# Patient Record
Sex: MALE | Race: WHITE | NOT HISPANIC OR LATINO | Employment: FULL TIME | URBAN - METROPOLITAN AREA
[De-identification: names, ages, dates, MRNs, and addresses within clinical notes are randomized per-mention and may not be internally consistent; named-entity substitution may affect disease eponyms.]

---

## 2019-03-13 ENCOUNTER — HOSPITAL ENCOUNTER (OUTPATIENT)
Dept: FAMILY MEDICINE CLINIC | Facility: CLINIC | Age: 59
Discharge: HOME | End: 2019-03-13
Attending: FAMILY MEDICINE

## 2019-03-13 LAB
ALBUMIN SERPL-MCNC: 4.3 G/DL (ref 3.5–5)
ALBUMIN/GLOB SERPL: 1.6 {RATIO} (ref 1.4–2.6)
ALP SERPL-CCNC: 78 U/L (ref 56–119)
ALT SERPL-CCNC: 28 U/L (ref 10–40)
ANION GAP SERPL CALC-SCNC: 15 MMOL/L (ref 8–19)
AST SERPL-CCNC: 22 U/L (ref 15–50)
BILIRUB SERPL-MCNC: 0.5 MG/DL (ref 0.2–1.3)
BUN SERPL-MCNC: 17 MG/DL (ref 5–25)
BUN/CREAT SERPL: 16 {RATIO} (ref 6–20)
CALCIUM SERPL-MCNC: 9.4 MG/DL (ref 8.7–10.4)
CHLORIDE SERPL-SCNC: 105 MMOL/L (ref 99–111)
CHOLEST SERPL-MCNC: 167 MG/DL (ref 107–200)
CHOLEST/HDLC SERPL: 3.6 {RATIO} (ref 3–6)
CONV CO2: 28 MMOL/L (ref 22–32)
CONV TOTAL PROTEIN: 7 G/DL (ref 6.3–8.2)
CREAT UR-MCNC: 1.06 MG/DL (ref 0.7–1.2)
EST. AVERAGE GLUCOSE BLD GHB EST-MCNC: 108 MG/DL
GFR SERPLBLD BASED ON 1.73 SQ M-ARVRAT: >60 ML/MIN/{1.73_M2}
GLOBULIN UR ELPH-MCNC: 2.7 G/DL (ref 2–3.5)
GLUCOSE SERPL-MCNC: 92 MG/DL (ref 70–99)
HBA1C MFR BLD: 5.4 % (ref 3.5–5.7)
HDLC SERPL-MCNC: 47 MG/DL (ref 40–60)
LDLC SERPL CALC-MCNC: 94 MG/DL (ref 70–100)
OSMOLALITY SERPL CALC.SUM OF ELEC: 299 MOSM/KG (ref 273–304)
POTASSIUM SERPL-SCNC: 3.5 MMOL/L (ref 3.5–5.3)
SODIUM SERPL-SCNC: 144 MMOL/L (ref 135–147)
TRIGL SERPL-MCNC: 128 MG/DL (ref 40–150)
TSH SERPL-ACNC: 1.13 M[IU]/L (ref 0.27–4.2)
VLDLC SERPL-MCNC: 26 MG/DL (ref 5–37)

## 2019-07-09 ENCOUNTER — HOSPITAL ENCOUNTER (OUTPATIENT)
Dept: LAB | Facility: HOSPITAL | Age: 59
Discharge: HOME | End: 2019-07-09
Attending: FAMILY MEDICINE

## 2019-07-09 LAB — PSA SERPL-MCNC: 1.07 NG/ML (ref 0–4)

## 2019-11-06 ENCOUNTER — HOSPITAL ENCOUNTER (OUTPATIENT)
Dept: FAMILY MEDICINE CLINIC | Facility: CLINIC | Age: 59
Discharge: HOME | End: 2019-11-06
Attending: FAMILY MEDICINE

## 2019-11-06 LAB
ALBUMIN SERPL-MCNC: 4.3 G/DL (ref 3.5–5)
ALBUMIN/GLOB SERPL: 1.5 {RATIO} (ref 1.4–2.6)
ALP SERPL-CCNC: 78 U/L (ref 56–155)
ALT SERPL-CCNC: 28 U/L (ref 10–40)
ANION GAP SERPL CALC-SCNC: 16 MMOL/L (ref 8–19)
AST SERPL-CCNC: 22 U/L (ref 15–50)
BASOPHILS # BLD AUTO: 0.06 10*3/UL (ref 0–0.2)
BASOPHILS NFR BLD AUTO: 1 % (ref 0–3)
BILIRUB SERPL-MCNC: 0.39 MG/DL (ref 0.2–1.3)
BUN SERPL-MCNC: 18 MG/DL (ref 5–25)
BUN/CREAT SERPL: 16 {RATIO} (ref 6–20)
CALCIUM SERPL-MCNC: 9.2 MG/DL (ref 8.7–10.4)
CHLORIDE SERPL-SCNC: 101 MMOL/L (ref 99–111)
CONV ABS IMM GRAN: 0.02 10*3/UL (ref 0–0.2)
CONV CO2: 26 MMOL/L (ref 22–32)
CONV IMMATURE GRAN: 0.3 % (ref 0–1.8)
CONV TOTAL PROTEIN: 7.2 G/DL (ref 6.3–8.2)
CREAT UR-MCNC: 1.15 MG/DL (ref 0.7–1.2)
DEPRECATED RDW RBC AUTO: 40 FL (ref 35.1–43.9)
EOSINOPHIL # BLD AUTO: 0.03 10*3/UL (ref 0–0.7)
EOSINOPHIL # BLD AUTO: 0.5 % (ref 0–7)
ERYTHROCYTE [DISTWIDTH] IN BLOOD BY AUTOMATED COUNT: 13.1 % (ref 11.6–14.4)
EST. AVERAGE GLUCOSE BLD GHB EST-MCNC: 111 MG/DL
GFR SERPLBLD BASED ON 1.73 SQ M-ARVRAT: >60 ML/MIN/{1.73_M2}
GLOBULIN UR ELPH-MCNC: 2.9 G/DL (ref 2–3.5)
GLUCOSE SERPL-MCNC: 91 MG/DL (ref 70–99)
HBA1C MFR BLD: 5.5 % (ref 3.5–5.7)
HCT VFR BLD AUTO: 44.6 % (ref 42–52)
HGB BLD-MCNC: 14.7 G/DL (ref 14–18)
LYMPHOCYTES # BLD AUTO: 1.11 10*3/UL (ref 1–5)
LYMPHOCYTES NFR BLD AUTO: 18 % (ref 20–45)
MCH RBC QN AUTO: 27.9 PG (ref 27–31)
MCHC RBC AUTO-ENTMCNC: 33 G/DL (ref 33–37)
MCV RBC AUTO: 84.8 FL (ref 80–96)
MONOCYTES # BLD AUTO: 0.44 10*3/UL (ref 0.2–1.2)
MONOCYTES NFR BLD AUTO: 7.1 % (ref 3–10)
NEUTROPHILS # BLD AUTO: 4.52 10*3/UL (ref 2–8)
NEUTROPHILS NFR BLD AUTO: 73.1 % (ref 30–85)
NRBC CBCN: 0 % (ref 0–0.7)
OSMOLALITY SERPL CALC.SUM OF ELEC: 289 MOSM/KG (ref 273–304)
PLATELET # BLD AUTO: 187 10*3/UL (ref 130–400)
PMV BLD AUTO: 10.9 FL (ref 9.4–12.4)
POTASSIUM SERPL-SCNC: 3.8 MMOL/L (ref 3.5–5.3)
RBC # BLD AUTO: 5.26 10*6/UL (ref 4.7–6.1)
SODIUM SERPL-SCNC: 139 MMOL/L (ref 135–147)
TSH SERPL-ACNC: 1.29 M[IU]/L (ref 0.27–4.2)
WBC # BLD AUTO: 6.18 10*3/UL (ref 4.8–10.8)

## 2020-01-20 ENCOUNTER — HOSPITAL ENCOUNTER (OUTPATIENT)
Dept: SLEEP MEDICINE | Facility: HOSPITAL | Age: 60
Discharge: HOME | End: 2020-01-20
Attending: INTERNAL MEDICINE

## 2020-01-20 ENCOUNTER — OUTSIDE FACILITY SERVICE (OUTPATIENT)
Dept: SLEEP MEDICINE | Facility: HOSPITAL | Age: 60
End: 2020-01-20

## 2020-01-20 PROCEDURE — 99244 OFF/OP CNSLTJ NEW/EST MOD 40: CPT | Performed by: INTERNAL MEDICINE

## 2020-01-29 ENCOUNTER — HOSPITAL ENCOUNTER (OUTPATIENT)
Dept: SLEEP MEDICINE | Facility: HOSPITAL | Age: 60
Discharge: HOME | End: 2020-01-29
Attending: INTERNAL MEDICINE

## 2020-02-03 ENCOUNTER — OUTSIDE FACILITY SERVICE (OUTPATIENT)
Dept: SLEEP MEDICINE | Facility: HOSPITAL | Age: 60
End: 2020-02-03

## 2020-02-03 PROCEDURE — 95806 SLEEP STUDY UNATT&RESP EFFT: CPT | Performed by: INTERNAL MEDICINE

## 2020-06-17 ENCOUNTER — HOSPITAL ENCOUNTER (OUTPATIENT)
Dept: LAB | Facility: HOSPITAL | Age: 60
Discharge: HOME | End: 2020-06-17
Attending: FAMILY MEDICINE

## 2020-06-17 LAB
ALBUMIN SERPL-MCNC: 4.2 G/DL (ref 3.5–5)
ALBUMIN/GLOB SERPL: 1.4 {RATIO} (ref 1.4–2.6)
ALP SERPL-CCNC: 80 U/L (ref 56–155)
ALT SERPL-CCNC: 27 U/L (ref 10–40)
ANION GAP SERPL CALC-SCNC: 20 MMOL/L (ref 8–19)
AST SERPL-CCNC: 25 U/L (ref 15–50)
BILIRUB SERPL-MCNC: 0.46 MG/DL (ref 0.2–1.3)
BUN SERPL-MCNC: 11 MG/DL (ref 5–25)
BUN/CREAT SERPL: 10 {RATIO} (ref 6–20)
CALCIUM SERPL-MCNC: 9.2 MG/DL (ref 8.7–10.4)
CHLORIDE SERPL-SCNC: 102 MMOL/L (ref 99–111)
CHOLEST SERPL-MCNC: 159 MG/DL (ref 107–200)
CHOLEST/HDLC SERPL: 3.1 {RATIO} (ref 3–6)
CONV CO2: 23 MMOL/L (ref 22–32)
CONV TOTAL PROTEIN: 7.1 G/DL (ref 6.3–8.2)
CREAT UR-MCNC: 1.07 MG/DL (ref 0.7–1.2)
EST. AVERAGE GLUCOSE BLD GHB EST-MCNC: 123 MG/DL
GFR SERPLBLD BASED ON 1.73 SQ M-ARVRAT: >60 ML/MIN/{1.73_M2}
GLOBULIN UR ELPH-MCNC: 2.9 G/DL (ref 2–3.5)
GLUCOSE SERPL-MCNC: 95 MG/DL (ref 70–99)
HBA1C MFR BLD: 5.9 % (ref 3.5–5.7)
HDLC SERPL-MCNC: 52 MG/DL (ref 40–60)
LDLC SERPL CALC-MCNC: 83 MG/DL (ref 70–100)
OSMOLALITY SERPL CALC.SUM OF ELEC: 291 MOSM/KG (ref 273–304)
POTASSIUM SERPL-SCNC: 3.7 MMOL/L (ref 3.5–5.3)
SODIUM SERPL-SCNC: 141 MMOL/L (ref 135–147)
TRIGL SERPL-MCNC: 121 MG/DL (ref 40–150)
TSH SERPL-ACNC: 2.47 M[IU]/L (ref 0.27–4.2)
VLDLC SERPL-MCNC: 24 MG/DL (ref 5–37)

## 2020-11-16 ENCOUNTER — HOSPITAL ENCOUNTER (OUTPATIENT)
Dept: FAMILY MEDICINE CLINIC | Facility: CLINIC | Age: 60
Discharge: HOME | End: 2020-11-16
Attending: FAMILY MEDICINE

## 2020-11-16 LAB
EST. AVERAGE GLUCOSE BLD GHB EST-MCNC: 140 MG/DL
HBA1C MFR BLD: 6.5 % (ref 3.5–5.7)

## 2021-03-03 ENCOUNTER — HOSPITAL ENCOUNTER (OUTPATIENT)
Dept: FAMILY MEDICINE CLINIC | Facility: CLINIC | Age: 61
Discharge: HOME | End: 2021-03-03
Attending: FAMILY MEDICINE

## 2021-03-03 LAB
ALBUMIN SERPL-MCNC: 4.1 G/DL (ref 3.5–5)
ALBUMIN/GLOB SERPL: 1.5 {RATIO} (ref 1.4–2.6)
ALP SERPL-CCNC: 75 U/L (ref 56–155)
ALT SERPL-CCNC: 25 U/L (ref 10–40)
ANION GAP SERPL CALC-SCNC: 16 MMOL/L (ref 8–19)
AST SERPL-CCNC: 15 U/L (ref 15–50)
BILIRUB SERPL-MCNC: 0.62 MG/DL (ref 0.2–1.3)
BUN SERPL-MCNC: 13 MG/DL (ref 5–25)
BUN/CREAT SERPL: 11 {RATIO} (ref 6–20)
CALCIUM SERPL-MCNC: 9.6 MG/DL (ref 8.7–10.4)
CHLORIDE SERPL-SCNC: 101 MMOL/L (ref 99–111)
CHOLEST SERPL-MCNC: 175 MG/DL (ref 107–200)
CHOLEST/HDLC SERPL: 3 {RATIO} (ref 3–6)
CONV CO2: 28 MMOL/L (ref 22–32)
CONV TOTAL PROTEIN: 6.8 G/DL (ref 6.3–8.2)
CREAT UR-MCNC: 1.16 MG/DL (ref 0.7–1.2)
EST. AVERAGE GLUCOSE BLD GHB EST-MCNC: 108 MG/DL
GFR SERPLBLD BASED ON 1.73 SQ M-ARVRAT: >60 ML/MIN/{1.73_M2}
GLOBULIN UR ELPH-MCNC: 2.7 G/DL (ref 2–3.5)
GLUCOSE SERPL-MCNC: 93 MG/DL (ref 70–99)
HBA1C MFR BLD: 5.4 % (ref 3.5–5.7)
HDLC SERPL-MCNC: 59 MG/DL (ref 40–60)
LDLC SERPL CALC-MCNC: 92 MG/DL (ref 70–100)
OSMOLALITY SERPL CALC.SUM OF ELEC: 292 MOSM/KG (ref 273–304)
POTASSIUM SERPL-SCNC: 4.2 MMOL/L (ref 3.5–5.3)
SODIUM SERPL-SCNC: 141 MMOL/L (ref 135–147)
TRIGL SERPL-MCNC: 122 MG/DL (ref 40–150)
VLDLC SERPL-MCNC: 24 MG/DL (ref 5–37)

## 2021-03-08 ENCOUNTER — HOSPITAL ENCOUNTER (OUTPATIENT)
Dept: VACCINE CLINIC | Facility: HOSPITAL | Age: 61
Discharge: HOME | End: 2021-03-08
Attending: INTERNAL MEDICINE

## 2021-03-29 ENCOUNTER — HOSPITAL ENCOUNTER (OUTPATIENT)
Dept: VACCINE CLINIC | Facility: HOSPITAL | Age: 61
Discharge: HOME | End: 2021-03-29
Attending: INTERNAL MEDICINE

## 2021-03-31 ENCOUNTER — OFFICE VISIT CONVERTED (OUTPATIENT)
Dept: SURGERY | Facility: CLINIC | Age: 61
End: 2021-03-31
Attending: NURSE PRACTITIONER

## 2021-04-08 ENCOUNTER — HOSPITAL ENCOUNTER (OUTPATIENT)
Dept: GASTROENTEROLOGY | Facility: HOSPITAL | Age: 61
Setting detail: HOSPITAL OUTPATIENT SURGERY
Discharge: HOME | End: 2021-04-08
Attending: SURGERY

## 2021-04-08 LAB
ALBUMIN SERPL-MCNC: 4 G/DL (ref 3.5–5)
ALBUMIN/GLOB SERPL: 1.3 {RATIO} (ref 1.4–2.6)
ALP SERPL-CCNC: 82 U/L (ref 56–155)
ALT SERPL-CCNC: 28 U/L (ref 10–40)
ANION GAP SERPL CALC-SCNC: 14 MMOL/L (ref 8–19)
AST SERPL-CCNC: 16 U/L (ref 15–50)
BASOPHILS # BLD AUTO: 0.03 10*3/UL (ref 0–0.2)
BASOPHILS NFR BLD AUTO: 0.3 % (ref 0–3)
BILIRUB SERPL-MCNC: 0.56 MG/DL (ref 0.2–1.3)
BUN SERPL-MCNC: 9 MG/DL (ref 5–25)
BUN/CREAT SERPL: 9 {RATIO} (ref 6–20)
CALCIUM SERPL-MCNC: 9 MG/DL (ref 8.7–10.4)
CHLORIDE SERPL-SCNC: 104 MMOL/L (ref 99–111)
CONV ABS IMM GRAN: 0.05 10*3/UL (ref 0–0.2)
CONV CO2: 26 MMOL/L (ref 22–32)
CONV IMMATURE GRAN: 0.5 % (ref 0–1.8)
CONV TOTAL PROTEIN: 7 G/DL (ref 6.3–8.2)
CREAT UR-MCNC: 1.01 MG/DL (ref 0.7–1.2)
DEPRECATED RDW RBC AUTO: 39.9 FL (ref 35.1–43.9)
EOSINOPHIL # BLD AUTO: 0 % (ref 0–7)
EOSINOPHIL # BLD AUTO: 0 10*3/UL (ref 0–0.7)
ERYTHROCYTE [DISTWIDTH] IN BLOOD BY AUTOMATED COUNT: 13.2 % (ref 11.6–14.4)
GFR SERPLBLD BASED ON 1.73 SQ M-ARVRAT: >60 ML/MIN/{1.73_M2}
GLOBULIN UR ELPH-MCNC: 3 G/DL (ref 2–3.5)
GLUCOSE SERPL-MCNC: 113 MG/DL (ref 70–99)
HCT VFR BLD AUTO: 47.5 % (ref 42–52)
HGB BLD-MCNC: 15.9 G/DL (ref 14–18)
LYMPHOCYTES # BLD AUTO: 1.03 10*3/UL (ref 1–5)
LYMPHOCYTES NFR BLD AUTO: 10.2 % (ref 20–45)
MCH RBC QN AUTO: 28 PG (ref 27–31)
MCHC RBC AUTO-ENTMCNC: 33.5 G/DL (ref 33–37)
MCV RBC AUTO: 83.8 FL (ref 80–96)
MONOCYTES # BLD AUTO: 0.37 10*3/UL (ref 0.2–1.2)
MONOCYTES NFR BLD AUTO: 3.7 % (ref 3–10)
NEUTROPHILS # BLD AUTO: 8.57 10*3/UL (ref 2–8)
NEUTROPHILS NFR BLD AUTO: 85.3 % (ref 30–85)
NRBC CBCN: 0 % (ref 0–0.7)
OSMOLALITY SERPL CALC.SUM OF ELEC: 291 MOSM/KG (ref 273–304)
PLATELET # BLD AUTO: 225 10*3/UL (ref 130–400)
PMV BLD AUTO: 9.7 FL (ref 9.4–12.4)
POTASSIUM SERPL-SCNC: 3.4 MMOL/L (ref 3.5–5.3)
RBC # BLD AUTO: 5.67 10*6/UL (ref 4.7–6.1)
SODIUM SERPL-SCNC: 141 MMOL/L (ref 135–147)
TROPONIN T SERPL-MCNC: <0.01 NG/ML (ref 0–0.1)
TROPONIN T SERPL-MCNC: <0.01 NG/ML (ref 0–0.1)
WBC # BLD AUTO: 10.05 10*3/UL (ref 4.8–10.8)

## 2021-04-12 ENCOUNTER — OFFICE VISIT CONVERTED (OUTPATIENT)
Dept: CARDIOLOGY | Facility: CLINIC | Age: 61
End: 2021-04-12
Attending: INTERNAL MEDICINE

## 2021-04-23 ENCOUNTER — OFFICE VISIT CONVERTED (OUTPATIENT)
Dept: FAMILY MEDICINE CLINIC | Facility: CLINIC | Age: 61
End: 2021-04-23
Attending: FAMILY MEDICINE

## 2021-04-26 ENCOUNTER — HOSPITAL ENCOUNTER (OUTPATIENT)
Dept: NUCLEAR MEDICINE | Facility: HOSPITAL | Age: 61
Discharge: HOME | End: 2021-04-26
Attending: INTERNAL MEDICINE

## 2021-05-03 ENCOUNTER — HOSPITAL ENCOUNTER (OUTPATIENT)
Dept: LAB | Facility: HOSPITAL | Age: 61
Discharge: HOME | End: 2021-05-03
Attending: NURSE PRACTITIONER

## 2021-05-03 LAB
ALBUMIN SERPL-MCNC: 4 G/DL (ref 3.5–5)
ALBUMIN/GLOB SERPL: 1.3 {RATIO} (ref 1.4–2.6)
ALP SERPL-CCNC: 65 U/L (ref 56–155)
ALT SERPL-CCNC: 26 U/L (ref 10–40)
ANION GAP SERPL CALC-SCNC: 13 MMOL/L (ref 8–19)
AST SERPL-CCNC: 19 U/L (ref 15–50)
BASOPHILS # BLD AUTO: 0.04 10*3/UL (ref 0–0.2)
BASOPHILS NFR BLD AUTO: 0.7 % (ref 0–3)
BILIRUB SERPL-MCNC: 0.51 MG/DL (ref 0.2–1.3)
BUN SERPL-MCNC: 11 MG/DL (ref 5–25)
BUN/CREAT SERPL: 9 {RATIO} (ref 6–20)
CALCIUM SERPL-MCNC: 8.9 MG/DL (ref 8.7–10.4)
CHLORIDE SERPL-SCNC: 107 MMOL/L (ref 99–111)
CHOLEST SERPL-MCNC: 154 MG/DL (ref 107–200)
CHOLEST/HDLC SERPL: 3.4 {RATIO} (ref 3–6)
CONV ABS IMM GRAN: 0.01 10*3/UL (ref 0–0.2)
CONV CO2: 26 MMOL/L (ref 22–32)
CONV IMMATURE GRAN: 0.2 % (ref 0–1.8)
CONV TOTAL PROTEIN: 7 G/DL (ref 6.3–8.2)
CREAT UR-MCNC: 1.16 MG/DL (ref 0.7–1.2)
DEPRECATED RDW RBC AUTO: 44.1 FL (ref 35.1–43.9)
EOSINOPHIL # BLD AUTO: 0 % (ref 0–7)
EOSINOPHIL # BLD AUTO: 0 10*3/UL (ref 0–0.7)
ERYTHROCYTE [DISTWIDTH] IN BLOOD BY AUTOMATED COUNT: 14.3 % (ref 11.6–14.4)
GFR SERPLBLD BASED ON 1.73 SQ M-ARVRAT: >60 ML/MIN/{1.73_M2}
GLOBULIN UR ELPH-MCNC: 3 G/DL (ref 2–3.5)
GLUCOSE SERPL-MCNC: 98 MG/DL (ref 70–99)
HCT VFR BLD AUTO: 46.1 % (ref 42–52)
HDLC SERPL-MCNC: 45 MG/DL (ref 40–60)
HGB BLD-MCNC: 15 G/DL (ref 14–18)
LDLC SERPL CALC-MCNC: 86 MG/DL (ref 70–100)
LYMPHOCYTES # BLD AUTO: 1.26 10*3/UL (ref 1–5)
LYMPHOCYTES NFR BLD AUTO: 20.6 % (ref 20–45)
MCH RBC QN AUTO: 27.6 PG (ref 27–31)
MCHC RBC AUTO-ENTMCNC: 32.5 G/DL (ref 33–37)
MCV RBC AUTO: 84.9 FL (ref 80–96)
MONOCYTES # BLD AUTO: 0.39 10*3/UL (ref 0.2–1.2)
MONOCYTES NFR BLD AUTO: 6.4 % (ref 3–10)
NEUTROPHILS # BLD AUTO: 4.42 10*3/UL (ref 2–8)
NEUTROPHILS NFR BLD AUTO: 72.1 % (ref 30–85)
NRBC CBCN: 0 % (ref 0–0.7)
OSMOLALITY SERPL CALC.SUM OF ELEC: 293 MOSM/KG (ref 273–304)
PLATELET # BLD AUTO: 215 10*3/UL (ref 130–400)
PMV BLD AUTO: 11.2 FL (ref 9.4–12.4)
POTASSIUM SERPL-SCNC: 3.5 MMOL/L (ref 3.5–5.3)
RBC # BLD AUTO: 5.43 10*6/UL (ref 4.7–6.1)
SODIUM SERPL-SCNC: 142 MMOL/L (ref 135–147)
TRIGL SERPL-MCNC: 116 MG/DL (ref 40–150)
VLDLC SERPL-MCNC: 23 MG/DL (ref 5–37)
WBC # BLD AUTO: 6.12 10*3/UL (ref 4.8–10.8)

## 2021-05-10 ENCOUNTER — OFFICE VISIT CONVERTED (OUTPATIENT)
Dept: CARDIOLOGY | Facility: CLINIC | Age: 61
End: 2021-05-10
Attending: INTERNAL MEDICINE

## 2021-05-10 NOTE — H&P
History and Physical      Patient Name: Jerman Caro   Patient ID: [id number]   Sex: Male   YOB: 1960    Primary Care Provider: Clay Del Cid MD   Referring Provider: Clay Del Cid MD    Visit Date: March 31, 2021    Provider: OSCAR Dennis   Location: [facility]   Location Address: [address] [place] [address]  [id number]   Location Phone: [phone]          Chief Complaint  · Requesting colonoscopy  · Age 50 or over      History Of Present Illness  The patient is a 60 year old /White male presenting to the Surgical Specialist office on a referral from Clay Del Cid MD.   Jerman Caro needs to have a diagnostic colonoscopy.   Patient states that they have not had a colonoscopy.   Patient currently complains of: abnormal stool study   Patient Does not have family history of colon cancer.      Patient presents today on referral from Dr. Clay Del Cid for a positive Cologuard.  Patient denies any abdominal pain, change in bowel habit, or rectal bleeding.  He denies any family history of colorectal cancer.  No previous colonoscopy.       Past Medical History  Disease Name Date Onset Notes   Allergic rhinitis, chronic --  --    High blood pressure --  --          Past Surgical History  Procedure Name Date Notes   *I have had no surgeries --  --          Medication List  Name Date Started Instructions   amlodipine 10 mg oral tablet  take 1 tablet (10 mg) by oral route once daily   aspirin 81 mg oral tablet,chewable  chew 1 tablet (81 mg) by oral route once daily   metoprolol tartrate 50 mg oral tablet  take 1 tablet (50 mg) by oral route time per day with meals   Suprep Bowel Prep Kit 17.5-3.13-1.6 gram oral recon soln 03/31/2021 take as directed   terazosin 2 mg oral capsule  take 1 capsule (2 mg) by oral route once daily at bedtime   triamterene-hydrochlorothiazid 37.5-25 mg oral tablet  take 1 tablet by oral route once daily         Allergy List  Allergen Name Date  "Reaction Notes   NO KNOWN DRUG ALLERGIES --  --  --        Allergies Reconciled  Social History  Finding Status Start/Stop Quantity Notes   Tobacco --  --/-- --  --          Review of Systems  · Constitutional  o Denies  o : fever, chills  · Eyes  o Denies  o : yellowish discoloration of eyes  · HENT  o Denies  o : difficulty swallowing  · Cardiovascular  o Denies  o : chest pain, chest pain on exertion  · Respiratory  o Denies  o : shortness of breath  · Gastrointestinal  o Denies  o : nausea, vomiting, diarrhea, constipation  · Genitourinary  o Denies  o : abnormal color of urine  · Integument  o Denies  o : rash  · Neurologic  o Denies  o : tingling or numbness  · Musculoskeletal  o Denies  o : joint pain  · Endocrine  o Denies  o : weight gain, weight loss      Vitals  Date Time BP Position Site L\R Cuff Size HR RR TEMP (F) WT  HT  BMI kg/m2 BSA m2 O2 Sat FR L/min FiO2 HC       03/31/2021 03:33 PM       16  269lbs 2oz 5'  10\" 38.62 2.46             Physical Examination  · Constitutional  o Appearance  o : well developed, well-nourished, patient in no apparent distress  · Head and Face  o Head  o :   § Inspection  § : atraumatic, normocephalic  o Face  o :   § Inspection  § : no facial lesions  · Eyes  o Conjunctivae  o : conjunctivae normal  o Sclerae  o : sclerae white  · Neck  o Inspection/Palpation  o : normal appearance, no masses or tenderness, trachea midline  · Respiratory  o Respiratory Effort  o : breathing unlabored  · Skin and Subcutaneous Tissue  o General Inspection  o : no lesions present, no areas of discoloration, skin turgor normal, texture normal  · Neurologic  o Mental Status Examination  o :   § Orientation  § : grossly oriented to person, place and time  § Attention  § : attention normal, concentration abilities normal  § Fund of Knowledge  § : fund of knowledge within normal limits, patient aware of current events  o Gait and Station  o : normal gait, able to stand without " difficulty  · Psychiatric  o Judgement and Insight  o : judgment and insight intact  o Mood and Affect  o : mood normal, affect appropriate              Assessment  · Abnormal stool test     792.1/R19.5    Problems Reconciled  Plan  · Orders  o Consent for Colonoscopy with Possible Biopsy - Possible risks/complications, benefits, and alternatives to surgical or invasive procedure have been explained to patient and/or legal guardian. -Patient has been evaluated and can tolerate anesthesia and/or sedation. Risks, benefits, and alternatives to anesthesia and sedation have been explained to patient and/or legal guardian. (15270) - 792.1/R19.5 - 04/08/2021  · Medications  o Medications have been Reconciled  o Transition of Care or Provider Policy  · Instructions  o Surgical Facility: [facility]  o Handouts Provided Pre-Procedure Instructions including date, time, and location of procedure.   o PLAN: Proceeed with colonoscopy. Patient understands risks/benefits and is willing to proceed.   o ***Surgical Orders***  o RISK AND BENEFITS:  o Given these options, the patient has verbally expressed an understanding of the risks of the surgery and finds these risks acceptable. Will proceed with surgery as soon as possible.  o O.R. PREP: Per protocol   o IV: Per Anesthesia  o Please sign permit for: Colonoscopy with possible biopsies by Dr. Isaac.  o The above History and Physical Examination has been completed within 30 days of admission.  o ***Patient Status***  o Outpatient  o Follow up in the in the office post procedure.  o Electronically Identified Patient Education Materials Provided Electronically            Electronically Signed by: OSCAR Dennis -Author on March 31, 2021 03:54:44 PM

## 2021-05-11 NOTE — H&P
History and Physical      Patient Name: Jerman Caro   Patient ID: [id number]   Sex: Male   YOB: 1960    Primary Care Provider: Clay Del Cid MD   Referring Provider: Clay Del Cid MD    Visit Date: April 23, 2021    Provider: Andrew Estrada DO   Location: [facility]   Location Address: [address] [place] [address]  [id number]   Location Phone: [phone]          Chief Complaint  · New Patient/Establish Care  · Previous PCP was Dr. Del Cid  · Pt is also established with Dr. Hall/Cardiology for A-fib      History Of Present Illness  Jerman Caro is a 60 year old /White male who presents to establish care. He previously saw Dr. Del Cid who has since retired.      Abn. [place]-Guard- He had a positive [place]-gaurd. He went to have a colonoscopy and was found to be in a-fib. Thus he was sent to Cardiology and his colonoscopy was deferred.         A-fib- He was seen by DR Hall and started on Eliquis. He denies any palpitation or tachycardia.      Abn. glucose- the vast majority of his A1Cs' have been less then 6, but 1 was 6.5.       HTN- He states that he checks his BP and it has been good, 110-120/70's.            Past Medical History  Disease Name Date Onset Notes   Allergic rhinitis, chronic --  --    Atrial fibrillation --  --    Colon cancer screening 3/17/21 Positive- Referred for Colonscopy   Essential hypertension, benign --  --    Type 2 diabetes mellitus --  --          Past Surgical History  Procedure Name Date Notes   *I have had no surgeries --  --          Medication List  Name Date Started Instructions   amlodipine 5 mg oral tablet  take 0.5 tablet by oral route once daily   aspirin 81 mg oral tablet,chewable  chew 1 tablet (81 mg) by oral route once daily   Eliquis 5 mg oral tablet 04/08/2021 take 1 tablet 2 times per day   losartan 25 mg oral tablet  take 0.5 tablet by oral route once daily   Suprep Bowel Prep Kit 17.5-3.13-1.6 gram oral recon  "soln 03/31/2021 take as directed   terazosin 2 mg oral capsule  take 1 capsule (2 mg) by oral route once daily at bedtime   Toprol XL 50 mg oral tablet extended release 24 hr 04/08/2021 take 1 tablet 2 times daily   triamterene-hydrochlorothiazid 37.5-25 mg oral tablet  take 1 tablet by oral route once daily         Allergy List  Allergen Name Date Reaction Notes   NO KNOWN DRUG ALLERGIES --  --  --          Family Medical History  Disease Name Relative/Age Notes   *No Known Family History  --          Social History  Finding Status Start/Stop Quantity Notes   Alcohol Never --/-- --  --    Tobacco Never --/-- --  --          Review of Systems  · Constitutional  o Denies  o : fatigue  · Eyes  o Denies  o : blurred vision, changes in vision  · HENT  o Denies  o : headaches  · Cardiovascular  o Denies  o : chest Pain, palpitations, irregular heart beats, rapid heart rate  · Respiratory  o Denies  o : frequent cough, shortness of breath, wheezing  · Gastrointestinal  o Denies  o : changes in bowel habits, constipation, diarrhea, black stools, bloody stools      Vitals  Date Time BP Position Site L\R Cuff Size HR RR TEMP (F) WT  HT  BMI kg/m2 BSA m2 O2 Sat FR L/min FiO2 HC       04/12/2021 08:37 /84 Sitting    88 - R   267lbs 0oz 5'  10\" 38.31 2.45       04/23/2021 10:19 /95 Sitting    87 - R 18 98.3 267lbs 8oz 5'  10\" 38.38 2.45 98 %            Physical Examination  · Constitutional  o Appearance  o : well-nourished, well developed, alert, in no acute distress, well-tended appearance  · Head and Face  o Head  o :   § Inspection  § : atraumatic, normocephalic  o Face  o :   § Inspection  § : no facial lesions  · Eyes  o Conjunctivae  o : conjunctivae normal  o Sclerae  o : sclerae white  o Pupils and Irises  o : pupils equal and round, pupils reactive to light bilaterally  o Eyelids/Ocular Adnexae  o : eyelid appearance normal  · Ears, Nose, Mouth and Throat  o Ears  o :   § External Ears  § : appearance " within normal limits, no lesions present  o Nose  o :   § External Nose  § : appearance normal  o Oral Cavity  o :   § Oral Mucosa  § : oral mucosa normal  § Lips  § : lip appearance normal  § Teeth  § : normal dentition for age  § Gums  § : gums pink, non-swollen, no bleeding present  § Tongue  § : tongue appearance normal  § Palate  § : hard palate normal, soft palate appearance normal  · Neck  o Inspection/Palpation  o : normal appearance, no masses or tenderness, trachea midline  o Thyroid  o : gland size normal, nontender, no nodules or masses present on palpation  · Respiratory  o Respiratory Effort  o : breathing unlabored  o Auscultation of Lungs  o : normal breath sounds  · Cardiovascular  o Heart  o :   § Auscultation of Heart  § : regular rate, regularly irregular rhythm present, no murmurs present  o Peripheral Vascular System  o :   § Extremities  § : no edema  · Lymphatic  o Neck  o : no lymphadenopathy           Assessment  · Screening for depression     V79.0/Z13.89  · Essential hypertension, benign     401.1/I10  HIs home BP readings are great.   · Allergic rhinitis     477.9/J30.9  · Atrial fibrillation     427.31/I48.91  He has upcoming stress test with Dr Hall  · Establishing care with new doctor, encounter for     V65.8/Z76.89  · Abnormal stool test     792.1/R19.5  his cologuard was (+)> He will need to finish his cardia w/u and get cardia clearance before his colonoscopy.       Plan  · Orders  o Annual depression screening, 15 minutes (, [address]) - V79.0/Z13.89 - 04/23/2021  o ACO-18: Negative screen for clinical depression using a standardized tool () - V79.0/Z13.89 - 04/23/2021  o ACO - Pt declines to or was not able to provide an Advance Care Plan or name a Surrogate Decision Maker (1124F) - - 04/23/2021  o ACO-18: Negative screen for clinical depression using a standardized tool () - - 04/23/2021  o ACO-39: Current medications updated and reviewed (, 0199F) - -  04/23/2021  · Medications  o Medications have been Reconciled  o Transition of Care or Provider Policy  · Instructions  o Depression Screen completed and scanned into the EMR under the designated folder within the patient's documents.  o Today's PHQ-9 result is ___3  o Patient is taking medications as prescribed and doing well.   o Take all medications as prescribed/directed.  o Patient instructed/educated on their diet and exercise program.  o Patient was educated/instructed on their diagnosis, treatment and medications prior to discharge from the clinic today.  o Patient instructed to seek medical attention urgently for new or worsening symptoms.  o Call the office with any concerns or questions.  o Bring all medicines with their bottles to each office visit.  · Disposition  o Call or Return if symptoms worsen or persist.  o Return Visit Request in/on 6 months +/- 2 days (27989).            Electronically Signed by: Andrew Estrada DO -Author on April 23, 2021 11:13:06 AM

## 2021-05-14 VITALS
TEMPERATURE: 98.3 F | SYSTOLIC BLOOD PRESSURE: 143 MMHG | DIASTOLIC BLOOD PRESSURE: 95 MMHG | WEIGHT: 267.5 LBS | HEIGHT: 70 IN | RESPIRATION RATE: 18 BRPM | HEART RATE: 87 BPM | OXYGEN SATURATION: 98 % | BODY MASS INDEX: 38.3 KG/M2

## 2021-05-14 VITALS
SYSTOLIC BLOOD PRESSURE: 136 MMHG | BODY MASS INDEX: 38.22 KG/M2 | WEIGHT: 267 LBS | HEIGHT: 70 IN | DIASTOLIC BLOOD PRESSURE: 84 MMHG | HEART RATE: 88 BPM

## 2021-05-14 VITALS — RESPIRATION RATE: 16 BRPM | HEIGHT: 70 IN | WEIGHT: 269.12 LBS | BODY MASS INDEX: 38.53 KG/M2

## 2021-05-14 NOTE — PROGRESS NOTES
Progress Note      Patient Name: Jerman Caro   Patient ID: [id number]   Sex: Male   YOB: 1960    Primary Care Provider: Clay Del Cid MD   Referring Provider: Clay Del Cid MD    Visit Date: April 12, 2021    Provider: Marcia Hall MD   Location: [facility]   Location Address: [address] [place] [address], [address]   [place] Hall  [id number]   Location Phone: [phone]          Chief Complaint     Evaluate new-onset atrial fibrillation.       History Of Present Illness  REFERRING PROVIDER: Andrew Estrada DO   Jerman Caro is a 60 year old /White male who last week was due to have an endoscopy when he went into atrial fibrillation with rapid ventricular response. His metoprolol was increased to 50 mg b.i.d. and he was told to decrease his amlodipine to 1/2 tab unless his systolic is higher than 150 and then come back in to evaluate again today. He has not had any episodes of blood pressures higher than 150. He denies any chest pain or pressure. No palpitations, shortness of breath, swelling, dizziness, syncope, PND, or orthopnea. He is starting to walk more and watch his diet better.   PAST MEDICAL HISTORY: Hypertension; New-onset atrial fibrillation (4/8/21).   PSYCHOSOCIAL HISTORY: Denies smoking. Rarely uses alcohol.   CURRENT MEDICATIONS: Eliquis 5 mg b.i.d.; terazosin 2 mg daily; amlodipine 10 mg 1/2 tab since hospitalization last week; metoprolol succinate 50 mg b.i.d.; triamterene/hydrochlorothiazide 37.5/25 mg every other day.      ALLERGIES: No known drug allergies.       Review of Systems  · Cardiovascular  o Denies  o : palpitations (fast, fluttering, or skipping beats), swelling (feet, ankles, hands), shortness of breath while walking or lying flat, chest pain or angina pectoris   · Respiratory  o Denies  o : chronic or frequent cough      Vitals  Date Time BP Position Site L\R Cuff Size HR RR TEMP (F) WT  HT  BMI kg/m2 BSA m2 O2 Sat FR L/min FiO2 HC       04/12/2021 08:37 AM  "136/84 Sitting    88 - R   267lbs 0oz 5'  10\" 38.31 2.45             Physical Examination  · Constitutional  o Appearance  o : Awake, alert, in no acute distress, obese male.  · Eyes  o Conjunctivae  o : Conjunctivae normal.  · Ears, Nose, Mouth and Throat  o Oral Cavity  o :   § Oral Mucosa  § : Normal.  · Neck  o Inspection/Palpation/Auscultation  o : No lymphadenopathy. No JVD. No bruit. Good carotid upstroke.  · Respiratory  o Respiratory  o : Clear to percussion and auscultation. Good respiratory effort.  · Cardiovascular  o Heart  o : PMI is not well felt. S1 irregular, S2 normal, no S3, no S4, slightly tachycardic. Negative systolic/diastolic murmurs.   o Peripheral Vascular System  o :   § Extremities  § : Good femoral and pedal pulses. No pedal edema.  · Gastrointestinal  o Abdominal Examination  o : Soft. No masses or tenderness felt. No hepatosplenomegaly. Abdominal aorta is not palpable.  · EKG  o EKG  o : Performed in the office today.   o Indications  o : New-onset atrial fibrillation.  o Results  o : Still in atrial fibrillation at a rate of 86.  o Comparison  o : Better rate than his EKG of 04/08/2021.           Assessment     1.  Atrial fibrillation, new onset.   2.  Hypertension.          Plan     1.  Add losartan 25 mg 1/2 tab in the morning.  2.  Keep amlodipine 5 mg at night.   3.  Continue metoprolol ER 50 b.i.d. for his atrial fibrillation rate control.   4.  Continue the triamterene/hydrochlorothiazide for his hypertension.   5.  He will do another blood pressure log.   6.  Will do a stress test in view of his new-onset atrial fibrillation and to clear him for his endoscopy.   7.  If he continues to be in atrial fibrillation we will consider elective cardioversion.   8.  Will check a lipid panel, CBC, and CMP before followup in 1 month.      OSCAR Wagoner/Marcia Hall MD, FACC  JF:PM:rt               Electronically Signed by: Preeti Rolon-, Other -Author on April " 17, 2021 03:01:40 PM  Electronically Co-signed by: OSCAR Orta -Reviewer on April 19, 2021 07:29:59 AM  Electronically Co-signed by: Marcia Hall MD -Reviewer on April 20, 2021 12:58:21 PM

## 2021-05-18 ENCOUNTER — HOSPITAL ENCOUNTER (OUTPATIENT)
Dept: PERIOP | Facility: HOSPITAL | Age: 61
Discharge: HOME | End: 2021-05-18
Attending: INTERNAL MEDICINE

## 2021-05-18 LAB
ALBUMIN SERPL-MCNC: 4.3 G/DL (ref 3.5–5)
ALBUMIN/GLOB SERPL: 1.3 {RATIO} (ref 1.4–2.6)
ALP SERPL-CCNC: 76 U/L (ref 56–155)
ALT SERPL-CCNC: 29 U/L (ref 10–40)
ANION GAP SERPL CALC-SCNC: 15 MMOL/L (ref 8–19)
ANION GAP SERPL CALC-SCNC: 15 MMOL/L (ref 8–19)
APTT BLD: 25.1 S (ref 22.2–34.2)
AST SERPL-CCNC: 21 U/L (ref 15–50)
BASOPHILS # BLD AUTO: 0.02 10*3/UL (ref 0–0.2)
BASOPHILS NFR BLD AUTO: 0.3 % (ref 0–3)
BILIRUB SERPL-MCNC: 0.6 MG/DL (ref 0.2–1.3)
BUN SERPL-MCNC: 11 MG/DL (ref 5–25)
BUN SERPL-MCNC: 11 MG/DL (ref 5–25)
BUN/CREAT SERPL: 11 {RATIO} (ref 6–20)
BUN/CREAT SERPL: 11 {RATIO} (ref 6–20)
CALCIUM SERPL-MCNC: 9.4 MG/DL (ref 8.7–10.4)
CALCIUM SERPL-MCNC: 9.5 MG/DL (ref 8.7–10.4)
CHLORIDE SERPL-SCNC: 104 MMOL/L (ref 99–111)
CHLORIDE SERPL-SCNC: 104 MMOL/L (ref 99–111)
CONV ABS IMM GRAN: 0.02 10*3/UL (ref 0–0.2)
CONV CO2: 26 MMOL/L (ref 22–32)
CONV CO2: 26 MMOL/L (ref 22–32)
CONV IMMATURE GRAN: 0.3 % (ref 0–1.8)
CONV TOTAL PROTEIN: 7.7 G/DL (ref 6.3–8.2)
CREAT UR-MCNC: 1 MG/DL (ref 0.7–1.2)
CREAT UR-MCNC: 1.03 MG/DL (ref 0.7–1.2)
DEPRECATED RDW RBC AUTO: 43.1 FL (ref 35.1–43.9)
EOSINOPHIL # BLD AUTO: 0 % (ref 0–7)
EOSINOPHIL # BLD AUTO: 0 10*3/UL (ref 0–0.7)
ERYTHROCYTE [DISTWIDTH] IN BLOOD BY AUTOMATED COUNT: 14 % (ref 11.6–14.4)
GFR SERPLBLD BASED ON 1.73 SQ M-ARVRAT: >60 ML/MIN/{1.73_M2}
GFR SERPLBLD BASED ON 1.73 SQ M-ARVRAT: >60 ML/MIN/{1.73_M2}
GLOBULIN UR ELPH-MCNC: 3.4 G/DL (ref 2–3.5)
GLUCOSE SERPL-MCNC: 105 MG/DL (ref 70–99)
GLUCOSE SERPL-MCNC: 106 MG/DL (ref 70–99)
HCT VFR BLD AUTO: 49 % (ref 42–52)
HGB BLD-MCNC: 16 G/DL (ref 14–18)
INR PPP: 1.01 (ref 2–3)
LYMPHOCYTES # BLD AUTO: 1.19 10*3/UL (ref 1–5)
LYMPHOCYTES NFR BLD AUTO: 19.8 % (ref 20–45)
MCH RBC QN AUTO: 27.9 PG (ref 27–31)
MCHC RBC AUTO-ENTMCNC: 32.7 G/DL (ref 33–37)
MCV RBC AUTO: 85.4 FL (ref 80–96)
MONOCYTES # BLD AUTO: 0.35 10*3/UL (ref 0.2–1.2)
MONOCYTES NFR BLD AUTO: 5.8 % (ref 3–10)
NEUTROPHILS # BLD AUTO: 4.44 10*3/UL (ref 2–8)
NEUTROPHILS NFR BLD AUTO: 73.8 % (ref 30–85)
NRBC CBCN: 0 % (ref 0–0.7)
OSMOLALITY SERPL CALC.SUM OF ELEC: 292 MOSM/KG (ref 273–304)
OSMOLALITY SERPL CALC.SUM OF ELEC: 292 MOSM/KG (ref 273–304)
PLATELET # BLD AUTO: 204 10*3/UL (ref 130–400)
PMV BLD AUTO: 10.4 FL (ref 9.4–12.4)
POTASSIUM SERPL-SCNC: 3.9 MMOL/L (ref 3.5–5.3)
POTASSIUM SERPL-SCNC: 4.1 MMOL/L (ref 3.5–5.3)
PROTHROMBIN TIME: 11.2 S (ref 9.4–12)
RBC # BLD AUTO: 5.74 10*6/UL (ref 4.7–6.1)
SODIUM SERPL-SCNC: 141 MMOL/L (ref 135–147)
SODIUM SERPL-SCNC: 141 MMOL/L (ref 135–147)
WBC # BLD AUTO: 6.02 10*3/UL (ref 4.8–10.8)

## 2021-05-24 ENCOUNTER — OFFICE VISIT CONVERTED (OUTPATIENT)
Dept: CARDIOLOGY | Facility: CLINIC | Age: 61
End: 2021-05-24
Attending: INTERNAL MEDICINE

## 2021-06-06 NOTE — PROGRESS NOTES
"   Progress Note      Patient Name: Jerman Caro   Patient ID: [id number]   Sex: Male   YOB: 1960    Primary Care Provider: Andrew Estrada DO   Referring Provider: Andrew Estrada DO    Visit Date: May 24, 2021    Provider: Marcia Hall MD   Location: [facility]   Location Address: [address] [place] [address], [address]   [place], [place]  [id number]   Location Phone: [phone]          Chief Complaint     Fatigue, weakness, tiredness status post electrical cardioversion.       History Of Present Illness  REFERRING PROVIDER: Andrew Estrada DO   Jerman Caro is a 61 year old /White male with hypertensive heart disease and paroxysmal atrial fibrillation who underwent electrical cardioversion a week ago. He is doing fine, and his heart rate is regular now. However, he complains of being extremely fatigued and tired. He denies chest pain, palpitations, dizziness, or syncope.   PAST MEDICAL HISTORY: Hypertension; New-onset atrial fibrillation 04/08/2021.   PSYCHOSOCIAL HISTORY: Denies tobacco use. Rarely consumes alcohol.   CURRENT MEDICATIONS: Terazosin 2 mg at night; amlodipine 5 mg at night; metoprolol ER 75 mg b.i.d.; Eliquis 5 mg b.i.d.; triamterene-hydrochlorothiazide 37.5-25 mg every other day; losartan 25 mg 1/2 daily.      ALLERGIES:  No known drug allergies.       Review of Systems  · Cardiovascular  o Denies  o : palpitations (fast, fluttering, or skipping beats), swelling (feet, ankles, hands), shortness of breath while walking or lying flat, chest pain or angina pectoris   · Respiratory  o Denies  o : chronic or frequent cough      Vitals  Date Time BP Position Site L\R Cuff Size HR RR TEMP (F) WT  HT  BMI kg/m2 BSA m2 O2 Sat FR L/min FiO2 HC       05/24/2021 01:46 /72 Sitting    58 - R   264lbs 0oz 5'  10\" 37.88 2.43             Physical Examination  · Constitutional  o Appearance  o : Awake, alert, in no acute distress.  · Eyes  o Conjunctivae  o : Conjunctivae " normal.  · Ears, Nose, Mouth and Throat  o Oral Cavity  o :   § Oral Mucosa  § : Normal.  · Neck  o Inspection/Palpation/Auscultation  o : No lymphadenopathy. No JVD. No bruit. Good carotid upstroke.  · Respiratory  o Respiratory  o : Clear to percussion and auscultation. Good respiratory effort.  · Cardiovascular  o Heart  o : PMI not well felt. S1, irregular. S2, normal. N S3. No S4. Slightly tachycardic. Negative systolic/diastolic murmur.   o Peripheral Vascular System  o :   § Extremities  § : Good femoral and pedal pulses. No pedal edema.  · Gastrointestinal  o Abdominal Examination  o : Soft. No masses or tenderness felt. No hepatosplenomegaly. Abdominal aorta is not palpable.  · EKG  o EKG  o : Performed in the office today.  o Indications  o : Atrial fibrillation, Hypertension.   o Results  o : Sinus rhythm. Borderline intraventricular conduction delay.   o Comparison  o : This EKG is different than before since atrial fib has resolved.           Assessment     1.  Persistent atrial fibrillation with restoration of sinus rhythm.  2.  Hypertensive heart disease.  3.  Fatigue and weakness secondary to beta-blocker.       Plan     1.  Reduce the metoprolol succinate ER to 50 mg in the morning  and continue 75 mg at night.     2.  Continue Eliquis 5 mg b.i.d.  3.  Continue amlodipine and losartan at the current dosage.  4.  Follow up in 4 months.  Patient is okay to proceed with his colonoscopy and other surgeries as needed.  We        will have him hold his Eliquis 48 hours before the procedure.          Marcia Hall MD, [facility]  PM/m             Electronically Signed by: Marcia Hall MD -Author on May 30, 2021 04:33:02 PM

## 2021-06-06 NOTE — PROGRESS NOTES
"   Progress Note      Patient Name: Jerman Caro   Patient ID: [id number]   Sex: Male   YOB: 1960    Primary Care Provider: Andrew Estrada DO   Referring Provider: Andrew Estrada DO    Visit Date: May 10, 2021    Provider: Marcia Hall MD   Location: [facility]   Location Address: [address] [place] [address], [address]   [place], [place]  [id number]   Location Phone: [phone]          Chief Complaint     Followup of atrial fibrillation.       History Of Present Illness  REFERRING PROVIDER: Andrew Estrada DO   Jerman Caro is a 60 year old /White male with new-onset atrial fibrillation who feels okay. He denies any chest pain, palpitations, dizziness, or syncope. His blood pressure log looks very good.   PAST MEDICAL HISTORY: Hypertension; New-onset atrial fibrillation (04/08/2021).   PSYCHOSOCIAL HISTORY: Denies tobacco use. Rarely consumes alcohol.   CURRENT MEDICATIONS: Medications reviewed and are as documented.      ALLERGIES:  No known drug allergies.       Review of Systems  · Cardiovascular  o Denies  o : palpitations (fast, fluttering, or skipping beats), swelling (feet, ankles, hands), shortness of breath while walking or lying flat, chest pain or angina pectoris   · Respiratory  o Denies  o : chronic or frequent cough      Vitals  Date Time BP Position Site L\R Cuff Size HR RR TEMP (F) WT  HT  BMI kg/m2 BSA m2 O2 Sat FR L/min FiO2 HC       05/10/2021 11:44 /86 Sitting    84 - R   261lbs 0oz 5'  10\" 37.45 2.42             Physical Examination  · Constitutional  o Appearance  o : Awake, alert, in no acute distress.  · Eyes  o Conjunctivae  o : Conjunctivae normal.  · Ears, Nose, Mouth and Throat  o Oral Cavity  o :   § Oral Mucosa  § : Normal.  · Neck  o Inspection/Palpation/Auscultation  o : No lymphadenopathy. No JVD. No bruit. Good carotid upstroke.  · Respiratory  o Respiratory  o : Clear to percussion and auscultation. Good respiratory " effort.  · Cardiovascular  o Heart  o : PMI not well felt. S1, irregular. S2, normal. N S3. No S4. Slightly tachycardic. Negative systolic/diastolic murmur.   o Peripheral Vascular System  o :   § Extremities  § : Good femoral and pedal pulses. No pedal edema.  · Gastrointestinal  o Abdominal Examination  o : Soft. No masses or tenderness felt. No hepatosplenomegaly. Abdominal aorta is not palpable.  · EKG  o EKG  o : Performed in the office today.  o Indications  o : Atrial fibrillation.  o Results  o : Atrial fibrillation with moderate ventricular response. 83 beats per minute.  o Comparison  o : No change compared to previous EKG, except the rate is slightly slower.   · Diagnostic Testing  o Diagnostic Testing  o : His nuclear stress test results were reviewed with the patient. They are within normal limits.          Assessment     1.  New-onset atrial fibrillation, persistent.  2.  Hypertension, controlled.  3.  Morbid obesity.       Plan     1.  I have reviewed with him indications and risks of electrical cardioversion.  I am going to schedule it as an        outpatient procedure.    2.  If his atrial fib recurs, we may consider referring him for an ablation procedure.          Marcia Hall MD, FACC  PM:vm             Electronically Signed by: Marcia Hall MD -Author on May 13, 2021 11:06:57 AM

## 2021-06-10 RX ORDER — MULTIPLE VITAMINS W/ MINERALS TAB 9MG-400MCG
1 TAB ORAL DAILY
COMMUNITY
End: 2021-09-27 | Stop reason: SDUPTHER

## 2021-06-10 RX ORDER — TRIAMTERENE AND HYDROCHLOROTHIAZIDE 37.5; 25 MG/1; MG/1
1 CAPSULE ORAL EVERY OTHER DAY
COMMUNITY
End: 2022-07-12 | Stop reason: SDUPTHER

## 2021-06-10 RX ORDER — TERAZOSIN 2 MG/1
2 CAPSULE ORAL NIGHTLY
COMMUNITY
End: 2021-10-25 | Stop reason: SDUPTHER

## 2021-06-10 RX ORDER — CETIRIZINE HYDROCHLORIDE 10 MG/1
10 TABLET ORAL DAILY
COMMUNITY

## 2021-06-10 NOTE — PRE-PROCEDURE INSTRUCTIONS
PT INSTRUCTED WHERE TO COME TO AND INSTRUCTED ON CLEAR LQUIDS ON Monday 6/14/21 PLEASE NOTE THE COLOR OF YOUR LAST BM IT SHOULD BE CLEAR, MEDS TO TAKE ON MORNING OF PREOCEDURE: AMLODIPINE, AND METOPROL

## 2021-06-15 ENCOUNTER — ANESTHESIA EVENT (OUTPATIENT)
Dept: GASTROENTEROLOGY | Facility: HOSPITAL | Age: 61
End: 2021-06-15

## 2021-06-15 ENCOUNTER — HOSPITAL ENCOUNTER (OUTPATIENT)
Facility: HOSPITAL | Age: 61
Setting detail: HOSPITAL OUTPATIENT SURGERY
Discharge: HOME | End: 2021-06-15
Attending: SURGERY | Admitting: SURGERY

## 2021-06-15 ENCOUNTER — ANESTHESIA (OUTPATIENT)
Dept: GASTROENTEROLOGY | Facility: HOSPITAL | Age: 61
End: 2021-06-15

## 2021-06-15 VITALS
OXYGEN SATURATION: 94 % | HEIGHT: 70 IN | BODY MASS INDEX: 36.61 KG/M2 | HEART RATE: 72 BPM | TEMPERATURE: 98.5 F | SYSTOLIC BLOOD PRESSURE: 132 MMHG | RESPIRATION RATE: 20 BRPM | DIASTOLIC BLOOD PRESSURE: 91 MMHG | WEIGHT: 255.73 LBS

## 2021-06-15 DIAGNOSIS — R19.5 POSITIVE COLORECTAL CANCER SCREENING USING COLOGUARD TEST: ICD-10-CM

## 2021-06-15 PROCEDURE — 88305 TISSUE EXAM BY PATHOLOGIST: CPT | Performed by: SURGERY

## 2021-06-15 PROCEDURE — 25010000002 PROPOFOL 10 MG/ML EMULSION: Performed by: NURSE ANESTHETIST, CERTIFIED REGISTERED

## 2021-06-15 RX ORDER — SODIUM CHLORIDE 0.9 % (FLUSH) 0.9 %
3 SYRINGE (ML) INJECTION EVERY 12 HOURS SCHEDULED
Status: DISCONTINUED | OUTPATIENT
Start: 2021-06-15 | End: 2021-06-15 | Stop reason: HOSPADM

## 2021-06-15 RX ORDER — SODIUM CHLORIDE, SODIUM LACTATE, POTASSIUM CHLORIDE, CALCIUM CHLORIDE 600; 310; 30; 20 MG/100ML; MG/100ML; MG/100ML; MG/100ML
9 INJECTION, SOLUTION INTRAVENOUS CONTINUOUS
Status: DISCONTINUED | OUTPATIENT
Start: 2021-06-15 | End: 2021-06-15 | Stop reason: HOSPADM

## 2021-06-15 RX ORDER — SODIUM CHLORIDE 0.9 % (FLUSH) 0.9 %
10 SYRINGE (ML) INJECTION AS NEEDED
Status: DISCONTINUED | OUTPATIENT
Start: 2021-06-15 | End: 2021-06-15 | Stop reason: HOSPADM

## 2021-06-15 RX ORDER — PROPOFOL 10 MG/ML
VIAL (ML) INTRAVENOUS AS NEEDED
Status: DISCONTINUED | OUTPATIENT
Start: 2021-06-15 | End: 2021-06-15 | Stop reason: SURG

## 2021-06-15 RX ORDER — SODIUM CHLORIDE, SODIUM LACTATE, POTASSIUM CHLORIDE, CALCIUM CHLORIDE 600; 310; 30; 20 MG/100ML; MG/100ML; MG/100ML; MG/100ML
INJECTION, SOLUTION INTRAVENOUS CONTINUOUS PRN
Status: DISCONTINUED | OUTPATIENT
Start: 2021-06-15 | End: 2021-06-15 | Stop reason: SURG

## 2021-06-15 RX ORDER — LIDOCAINE HYDROCHLORIDE 20 MG/ML
INJECTION, SOLUTION INFILTRATION; PERINEURAL AS NEEDED
Status: DISCONTINUED | OUTPATIENT
Start: 2021-06-15 | End: 2021-06-15 | Stop reason: SURG

## 2021-06-15 RX ADMIN — SODIUM CHLORIDE, POTASSIUM CHLORIDE, SODIUM LACTATE AND CALCIUM CHLORIDE: 600; 310; 30; 20 INJECTION, SOLUTION INTRAVENOUS at 09:47

## 2021-06-15 RX ADMIN — PROPOFOL 200 MCG/KG/MIN: 10 INJECTION, EMULSION INTRAVENOUS at 09:54

## 2021-06-15 RX ADMIN — PROPOFOL 50 MG: 10 INJECTION, EMULSION INTRAVENOUS at 09:54

## 2021-06-15 RX ADMIN — LIDOCAINE HYDROCHLORIDE 50 MG: 20 INJECTION, SOLUTION INFILTRATION; PERINEURAL at 09:54

## 2021-06-15 NOTE — ANESTHESIA POSTPROCEDURE EVALUATION
Patient: Jerman Caro    Procedure Summary     Date: 06/15/21 Room / Location: [facility] ENDOSCOPY 3 / [facility] ENDOSCOPY    Anesthesia Start: 0947 Anesthesia Stop: 1009    Procedure: COLONOSCOPY w/ bx (N/A ) Diagnosis: (ABN STOOL STUDY)    Surgeons: Boris Isaac MD Provider: Murtaza Arana MD    Anesthesia Type: general ASA Status: 2          Anesthesia Type: general    Vitals  Vitals Value Taken Time   /90 06/15/21 1013   Temp 36.8 °C (98.3 °F) 06/15/21 1008   Pulse 73 06/15/21 1013   Resp 12 06/15/21 1013   SpO2 97 % 06/15/21 1013           Post Anesthesia Care and Evaluation    Patient location during evaluation: bedside  Patient participation: complete - patient participated  Level of consciousness: awake  Pain score: 0  Pain management: adequate  Airway patency: patent  Anesthetic complications: No anesthetic complications  PONV Status: none  Cardiovascular status: acceptable and stable  Respiratory status: acceptable and room air  Hydration status: acceptable

## 2021-06-15 NOTE — ANESTHESIA PREPROCEDURE EVALUATION
Anesthesia Evaluation     Patient summary reviewed and Nursing notes reviewed   no history of anesthetic complications:  NPO Solid Status: > 8 hours  NPO Liquid Status: > 2 hours           Airway   Mallampati: II  TM distance: >3 FB  Neck ROM: full  No difficulty expected  Dental      Pulmonary - negative pulmonary ROS and normal exam    breath sounds clear to auscultation  Cardiovascular - negative cardio ROS and normal exam  Exercise tolerance: good (4-7 METS)    Rhythm: regular        Neuro/Psych- negative ROS  GI/Hepatic/Renal/Endo - negative ROS     Musculoskeletal (-) negative ROS    Abdominal    Substance History - negative use     OB/GYN negative ob/gyn ROS         Other - negative ROS                       Anesthesia Plan    ASA 2     general   total IV anesthesia(Patient understands anesthesia not responsible for dental damage.)  intravenous induction     Anesthetic plan, all risks, benefits, and alternatives have been provided, discussed and informed consent has been obtained with: patient.  Use of blood products discussed with patient .   Plan discussed with CRNA.

## 2021-06-15 NOTE — H&P
General Surgery/Colorectal Surgery Note    Patient Name:  Jerman Caro  YOB: 1960  [id number]    Referring Provider: Andrew Estrada*      Patient Care Team:  Andrew Estrada DO as PCP - General (Family Medicine)    Chief complaint postive cologaurd     Subjective .     History of present illness:  The patient is a 60 year old /White male presenting to the Surgical Specialist office on a referral from Clay Del Cid MD.   Jerman Caro needs to have a diagnostic colonoscopy.   Patient states that they have not had a colonoscopy.   Patient currently complains of: abnormal stool study   Patient Does not have family history of colon cancer.       Patient presents today on referral from Dr. Clay Del Cid for a positive Cologuard.  Patient denies any abdominal pain, change in bowel habit, or rectal bleeding.  He denies any family history of colorectal cancer.  No previous colonoscopy.     Afib on recent cscope attempt. Cardioversion, started on eliquis, off 3 days, no CP      History:  Past Medical History:   Diagnosis Date   • Coronary artery disease     NEW ONSET OF A-FIB CORRECTED BY CARDIOVERSION 5/2021   • Hypertension    • Sleep apnea     USES CPAP       History reviewed. No pertinent surgical history.    Family History   Problem Relation Age of Onset   • Hypertension Mother    • Heart disease Father    • Hypertension Father    • Hypertension Brother        Social History     Tobacco Use   • Smoking status: Never Smoker   Substance Use Topics   • Alcohol use: Yes     Comment: VERY RARELY   • Drug use: Never       Review of Systems  All systems were reviewed and negative except for:   Review of Systems   Constitutional: Negative for chills, fever and unexpected weight loss.   HENT: Negative for congestion, nosebleeds and voice change.    Eyes: Negative for blurred vision, double vision and discharge.   Respiratory: Negative for apnea, chest tightness and shortness of breath.     Cardiovascular: Negative for chest pain and leg swelling.   Gastrointestinal:        See HPI   Endocrine: Negative for cold intolerance and heat intolerance.   Genitourinary: Negative for dysuria, hematuria and urgency.   Musculoskeletal: Negative for back pain, joint swelling and neck pain.   Skin: Negative for color change and dry skin.   Neurological: Negative for dizziness and confusion.   Hematological: Negative for adenopathy.   Psychiatric/Behavioral: Negative for agitation and behavioral problems.     MEDS:  Prior to Admission medications    Medication Sig Start Date End Date Taking? Authorizing Provider   AMLODIPINE BESYLATE PO Take 5 mg by mouth Daily.   Yes Miah Nguyen MD   apixaban (Eliquis) 5 MG tablet tablet Take 5 mg by mouth 2 (Two) Times a Day. LAST ALMONTE TAKEN Friday June 11, 2021 PM   Yes Miah Nguyen MD   cetirizine (ZyrTEC Allergy) 10 MG tablet Take 10 mg by mouth Daily.   Yes Miah Nguyen MD   LOSARTAN POTASSIUM PO Take 12.5 mg by mouth Daily.   Yes Miah Nguyen MD   METOPROLOL SUCCINATE ER PO Take 50 mg by mouth Daily. PT TAKES PO 50 MG IN AM AND 75 MG AT HS   Yes Miah Nguyen MD   multivitamin with minerals tablet tablet Take 1 tablet by mouth Daily.   Yes Miah Nguyen MD   terazosin (HYTRIN) 2 MG capsule Take 2 mg by mouth Every Night.   Yes Miah Nguyen MD   triamterene-hydrochlorothiazide (DYAZIDE) 37.5-25 MG per capsule Take 1 capsule by mouth Every Other Day.   Yes Miah Nguyen MD        sodium chloride, 3 mL, Intravenous, Q12H         lactated ringers, 9 mL/hr         Allergies:  Patient has no known allergies.    Objective     Vital Signs        Physical Exam:     General Appearance:    Alert, cooperative, in no acute distress   Head:    Normocephalic, without obvious abnormality, atraumatic   Eyes:          Conjunctivae and sclerae normal, no icterus,     Ears:    Ears appear intact with no abnormalities noted    Throat:   No oral lesions, no thrush, oral mucosa moist   Neck:   No adenopathy, supple, trachea midline, no thyromegaly   Back:     No kyphosis present, no scoliosis present, no skin lesions,      erythema or scars, no tenderness to percussion or                   palpation,   range of motion normal   Lungs:     Clear to auscultation,respirations regular, even and                  unlabored    Heart:    Regular rhythm and normal rate, normal S1 and S2, no            murmur, no gallop, no rub, no click   Chest Wall:    No abnormalities observed   Abdomen:     Normal bowel sounds, no masses, no organomegaly, soft        non-tender, non-distended, no guarding, no rebound                tenderness   Rectal:        Extremities:   Moves all extremities well, no edema, no cyanosis, no             redness   Pulses:   Pulses palpable and equal bilaterally   Skin:   No bleeding, bruising or rash   Lymph nodes:   No palpable adenopathy   Neurologic:   A/o x 4 with no deficits       Results Review: I have reviewed the patient's labs and imaging    LABS/IMAGING:    Imaging Results (Last 72 Hours)     ** No results found for the last 72 hours. **           Lab Results (last 72 hours)     ** No results found for the last 72 hours. **             Result Review :     Assessment/Plan     * No active hospital problems. *     Positive cologuard    Risks, benefits and alternative of colonoscopy discussed          Boris Isaac MD  06/15/21 09:08 EDT

## 2021-06-16 LAB
CYTO UR: NORMAL
LAB AP CASE REPORT: NORMAL
LAB AP CLINICAL INFORMATION: NORMAL
PATH REPORT.FINAL DX SPEC: NORMAL
PATH REPORT.GROSS SPEC: NORMAL

## 2021-06-30 ENCOUNTER — OFFICE VISIT (OUTPATIENT)
Dept: SURGERY | Facility: CLINIC | Age: 61
End: 2021-06-30

## 2021-06-30 VITALS — BODY MASS INDEX: 38.08 KG/M2 | WEIGHT: 266 LBS | HEIGHT: 70 IN | RESPIRATION RATE: 14 BRPM

## 2021-06-30 DIAGNOSIS — D12.6 ADENOMATOUS POLYP OF COLON, UNSPECIFIED PART OF COLON: Primary | ICD-10-CM

## 2021-06-30 PROCEDURE — 99212 OFFICE O/P EST SF 10 MIN: CPT | Performed by: SURGERY

## 2021-06-30 NOTE — PROGRESS NOTES
General Surgery/Colorectal Surgery Note    Patient Name:  Jerman Caro  YOB: 1960  [id number]    Referring Provider: No ref. provider found      Patient Care Team:  Andrew Estrada DO as PCP - General (Family Medicine)    Chief complaint follow-up endoscopy    Subjective .     History of present illness: History of positive Cologuard test.  Patient denies any blood in stool.  Status post colonoscopy 6/15/2021 with 2 mm polyp removed in the cecum.  Pathology with tubular adenoma.  He comes in with follow-up.  No changes since last seen.      History:  Past Medical History:   Diagnosis Date   • Coronary artery disease     NEW ONSET OF A-FIB CORRECTED BY CARDIOVERSION 5/2021   • Hypertension    • Sleep apnea     USES CPAP       Past Surgical History:   Procedure Laterality Date   • COLONOSCOPY N/A 6/15/2021    Procedure: COLONOSCOPY w/ bx;  Surgeon: Boris Isaac MD;  Location: [facility] ENDOSCOPY;  Service: General;  Laterality: N/A;  colon polyp       Family History   Problem Relation Age of Onset   • Hypertension Mother    • Heart disease Father    • Hypertension Father    • Hypertension Brother        Social History     Tobacco Use   • Smoking status: Never Smoker   • Smokeless tobacco: Never Used   Substance Use Topics   • Alcohol use: Yes     Comment: VERY RARELY   • Drug use: Never       Review of Systems  All systems were reviewed and negative except for:   Review of Systems   Constitutional: Negative for chills, fever and unexpected weight loss.   HENT: Negative for congestion, nosebleeds and voice change.    Eyes: Negative for blurred vision, double vision and discharge.   Respiratory: Negative for apnea, chest tightness and shortness of breath.    Cardiovascular: Negative for chest pain and leg swelling.   Gastrointestinal:        See HPI   Endocrine: Negative for cold intolerance and heat intolerance.   Genitourinary: Negative for dysuria, hematuria and urgency.    Musculoskeletal: Negative for back pain, joint swelling and neck pain.   Skin: Negative for color change and dry skin.   Neurological: Negative for dizziness and confusion.   Hematological: Negative for adenopathy.   Psychiatric/Behavioral: Negative for agitation and behavioral problems.     MEDS:  Prior to Admission medications    Medication Sig Start Date End Date Taking? Authorizing Provider   AMLODIPINE BESYLATE PO Take 5 mg by mouth Daily.   Yes Miah Nguyen MD   apixaban (Eliquis) 5 MG tablet tablet Take 5 mg by mouth 2 (Two) Times a Day. LAST ALMONTE TAKEN Friday June 11, 2021 PM   Yes Miah Nguyen MD   cetirizine (ZyrTEC Allergy) 10 MG tablet Take 10 mg by mouth Daily.   Yes Miah Nguyen MD   LOSARTAN POTASSIUM PO Take 12.5 mg by mouth Daily.   Yes Miah Nguyen MD   METOPROLOL SUCCINATE ER PO Take 50 mg by mouth Daily. PT TAKES PO 50 MG IN AM AND 75 MG AT HS   Yes Miah Nguyen MD   multivitamin with minerals tablet tablet Take 1 tablet by mouth Daily.   Yes Miah Nguyen MD   terazosin (HYTRIN) 2 MG capsule Take 2 mg by mouth Every Night.   Yes Miah Nguyen MD   triamterene-hydrochlorothiazide (DYAZIDE) 37.5-25 MG per capsule Take 1 capsule by mouth Every Other Day.   Yes Miah Nguyen MD        Allergies:  Patient has no known allergies.    Objective     Vital Signs   Resp:  [14] 14    Physical Exam:     General Appearance:    Alert, cooperative, in no acute distress   Head:    Normocephalic, without obvious abnormality, atraumatic   Eyes:          Conjunctivae and sclerae normal, no icterus,     Ears:    Ears appear intact with no abnormalities noted   Throat:   No oral lesions, no thrush, oral mucosa moist   Neck:   No adenopathy, supple, trachea midline, no thyromegaly   Back:     No kyphosis present, no scoliosis present, no skin lesions,      erythema or scars, no tenderness to percussion or                   palpation,   range of  "motion normal   Lungs:     Clear to auscultation,respirations regular, even and                  unlabored    Heart:    Regular rhythm and normal rate, normal S1 and S2, no            murmur, no gallop, no rub, no click   Chest Wall:    No abnormalities observed   Abdomen:     Normal bowel sounds, no masses, no organomegaly, soft        non-tender, non-distended, no guarding, no rebound                tenderness   Rectal:        Extremities:   Moves all extremities well, no edema, no cyanosis, no             redness   Pulses:   Pulses palpable and equal bilaterally   Skin:   No bleeding, bruising or rash   Lymph nodes:   No palpable adenopathy   Neurologic:   A/o x 4 with no deficits       Results Review:   {Results Review:[id number]::\"I reviewed the patient's new clinical results.\"    LABS/IMAGING:  Results for orders placed or performed during the hospital encounter of 06/15/21   Tissue Pathology Exam    Specimen: Large Intestine, Cecum; Polyp   Result Value Ref Range    Case Report       Surgical Pathology Report                         Case: [id number]                                  Authorizing Provider:  Boris Isaac MD  Collected:           06/15/2021 09:59 AM          Ordering Location:     [facility] Received:            06/15/2021 10:52 AM                                 SUITES                                                                       Pathologist:           Penelope Del Real DO                                                       Specimen:    Large Intestine, Cecum, cecal polyp bx                                                     Clinical Information      Final Diagnosis       Cecum polyp, biopsy:    - Early tubular adenoma          Gross Description       Part 1 is received in formalin labeled \" cecal polyp biopsy\".  The specimen consists of 2 pale-tan irregular friable soft tissue fragments filtered and measuring 0.3 cm in greatest aggregate dimension.  All 1A. " CRE      Microscopic Description          Result Review :     Assessment/Plan     Colon polyp    I reviewed the findings of the colonoscopy with the patient.  I reviewed the pathology.  Next colonoscopy in 5 years.  All questions answered.  He agrees with the plan.  Thank you for the consult.       Boris Isaac MD  06/30/21 15:59 EDT

## 2021-07-12 ENCOUNTER — OFFICE VISIT (OUTPATIENT)
Dept: ORTHOPEDIC SURGERY | Facility: CLINIC | Age: 61
End: 2021-07-12

## 2021-07-12 VITALS — BODY MASS INDEX: 38.8 KG/M2 | WEIGHT: 271 LBS | HEIGHT: 70 IN | HEART RATE: 108 BPM | OXYGEN SATURATION: 98 %

## 2021-07-12 DIAGNOSIS — S83.242A ACUTE MEDIAL MENISCUS TEAR OF LEFT KNEE, INITIAL ENCOUNTER: Primary | ICD-10-CM

## 2021-07-12 DIAGNOSIS — M25.562 LEFT KNEE PAIN, UNSPECIFIED CHRONICITY: ICD-10-CM

## 2021-07-12 PROCEDURE — 99213 OFFICE O/P EST LOW 20 MIN: CPT | Performed by: ORTHOPAEDIC SURGERY

## 2021-07-12 NOTE — PROGRESS NOTES
"Chief Complaint  Pain of the Left Knee     Subjective      Jerman Caro presents to [facility] for an evaluation of left knee. Patient loves to play golf and does a lot of twisting and turning on this knee. He has been having left knee pain for several years that has been on and off. In the last 3-4 days his symptoms have significantly improved. Pain is localized on the medial joint line.     No Known Allergies     Social History     Socioeconomic History   • Marital status:      Spouse name: Not on file   • Number of children: Not on file   • Years of education: Not on file   • Highest education level: Not on file   Tobacco Use   • Smoking status: Never Smoker   • Smokeless tobacco: Never Used   Substance and Sexual Activity   • Alcohol use: Never     Comment: VERY RARELY   • Drug use: Never   • Sexual activity: Defer        Review of Systems     Objective   Vital Signs:   Pulse 108   Ht 177.8 cm (70\")   Wt 123 kg (271 lb)   SpO2 98%   BMI 38.88 kg/m²       Physical Exam  Constitutional:       Appearance: Normal appearance. He is well-developed and normal weight.   HENT:      Head: Normocephalic.      Right Ear: Hearing and external ear normal.      Left Ear: Hearing and external ear normal.      Nose: Nose normal.   Eyes:      Conjunctiva/sclera: Conjunctivae normal.   Cardiovascular:      Rate and Rhythm: Normal rate.   Pulmonary:      Effort: Pulmonary effort is normal.      Breath sounds: No wheezing or rales.   Abdominal:      Palpations: Abdomen is soft.      Tenderness: There is no abdominal tenderness.   Musculoskeletal:      Cervical back: Normal range of motion.   Skin:     Findings: No rash.   Neurological:      Mental Status: He is alert and oriented to person, place, and time.   Psychiatric:         Mood and Affect: Mood and affect normal.         Judgment: Judgment normal.       Ortho Exam      LEFT KNEE: Good strength to hamstrings, quadriceps, dorsiflexors " and plantar flexors. Tender medial joint line. Pain with Apley's. Pain with McMuray's. Non-tender lateral joint line. Moderate effusion. Calf supple, non-tender. Full extension. Full flexion. Sensation grossly intact. Neurovascular intact. Skin intact. Negative Lachman. Stable to varus/valgus stress. Dorsal Pedal Pulse 2+, posteriror tibialis pulse 2+. No skin discoloration. Full weight bearing. Non-antalgic gait.       Procedures        Imaging Results (Most Recent)     Procedure Component Value Units Date/Time    XR Knee 3 View Left [[id number]] Resulted: 07/12/21 1415     Updated: 07/12/21 1419           Result Review :       X-Ray Report:  Left knee(s) X-Ray  Indication: Evaluation of left knee   AP, Lateral and Standing view(s)  Findings: Demonstrates no significant degenerative changes. No fracture or dislocation.   Prior studies available for comparison: no            Assessment and Plan     DX: Left knee possible medial meniscus tear     Discussed treatment plans and diagnosis with the patient. Patient opted to obtain a left knee MRI.     Call or return if worsening symptoms.    Follow Up     Follow-up after MRI.       Patient was given instructions and counseling regarding his condition or for health maintenance advice. Please see specific information pulled into the AVS if appropriate.     Scribed for Donn Hogan MD by Kathy Elizondo.  07/12/21   14:22 EDT    I have personally performed the services described in this document as scribed by the above individual and it is both accurate and complete.  Donn Hogan MD 07/12/21  14:22 EDT

## 2021-07-15 ENCOUNTER — TELEPHONE (OUTPATIENT)
Dept: FAMILY MEDICINE CLINIC | Facility: CLINIC | Age: 61
End: 2021-07-15

## 2021-07-15 VITALS
BODY MASS INDEX: 37.8 KG/M2 | WEIGHT: 264 LBS | SYSTOLIC BLOOD PRESSURE: 122 MMHG | DIASTOLIC BLOOD PRESSURE: 72 MMHG | HEART RATE: 58 BPM | HEIGHT: 70 IN

## 2021-07-15 VITALS
SYSTOLIC BLOOD PRESSURE: 130 MMHG | DIASTOLIC BLOOD PRESSURE: 86 MMHG | WEIGHT: 261 LBS | BODY MASS INDEX: 37.37 KG/M2 | HEART RATE: 84 BPM | HEIGHT: 70 IN

## 2021-07-15 NOTE — TELEPHONE ENCOUNTER
Called patient to advise no same day appointment and he should seek treatment at urgent care of his choice, or [facility]. Patient stated he would go to [place] closest to his home.

## 2021-07-15 NOTE — TELEPHONE ENCOUNTER
Caller: Jerman Caro    Relationship to patient: Self    Best call back number: [phone]     Patient is needing: PT CALLED WITH ACUTE SYMPTOMS, NEEDS AN APPT PLEASE ADVISE. THANK YOU.

## 2021-08-11 ENCOUNTER — OFFICE VISIT (OUTPATIENT)
Dept: ORTHOPEDIC SURGERY | Facility: CLINIC | Age: 61
End: 2021-08-11

## 2021-08-11 VITALS — HEART RATE: 58 BPM | WEIGHT: 271 LBS | BODY MASS INDEX: 38.8 KG/M2 | OXYGEN SATURATION: 96 % | HEIGHT: 70 IN

## 2021-08-11 DIAGNOSIS — M71.22 POPLITEAL CYST, LEFT: ICD-10-CM

## 2021-08-11 DIAGNOSIS — M17.12 PRIMARY OSTEOARTHRITIS OF LEFT KNEE: ICD-10-CM

## 2021-08-11 DIAGNOSIS — M25.562 LEFT KNEE PAIN, UNSPECIFIED CHRONICITY: Primary | ICD-10-CM

## 2021-08-11 PROCEDURE — 99213 OFFICE O/P EST LOW 20 MIN: CPT | Performed by: ORTHOPAEDIC SURGERY

## 2021-08-11 NOTE — PROGRESS NOTES
"Chief Complaint  Follow-up of the Left Knee     Subjective      Jerman Caro presents to [facility] for a follow-up of left knee. Patient has been having left knee pain for several years that has been on and off. Patient states that he plays a lot of golf and does a lot of twisting and turning on his knee. Within the last couple of days, he started having consistent and increased knee pain. Pain is localized around the medial joint line. Patient presents today with MRI results of his left knee. Since we last saw the patient, he states his left knee pain has resolved. He has fallen 1-2 days ago, directly on his knees, and still has no pain in his left knee.     No Known Allergies     Social History     Socioeconomic History   • Marital status:      Spouse name: Not on file   • Number of children: Not on file   • Years of education: Not on file   • Highest education level: Not on file   Tobacco Use   • Smoking status: Never Smoker   • Smokeless tobacco: Never Used   Vaping Use   • Vaping Use: Never used   Substance and Sexual Activity   • Alcohol use: Never     Comment: VERY RARELY   • Drug use: Never   • Sexual activity: Defer        Review of Systems     Objective   Vital Signs:   Pulse 58   Ht 177.8 cm (70\")   Wt 123 kg (271 lb)   SpO2 96%   BMI 38.88 kg/m²       Physical Exam  Constitutional:       Appearance: Normal appearance. He is well-developed and normal weight.   HENT:      Head: Normocephalic.      Right Ear: Hearing and external ear normal.      Left Ear: Hearing and external ear normal.      Nose: Nose normal.   Eyes:      Conjunctiva/sclera: Conjunctivae normal.   Cardiovascular:      Rate and Rhythm: Normal rate.   Pulmonary:      Effort: Pulmonary effort is normal.      Breath sounds: No wheezing or rales.   Abdominal:      Palpations: Abdomen is soft.      Tenderness: There is no abdominal tenderness.   Musculoskeletal:      Cervical back: Normal range of " motion.   Skin:     Findings: No rash.   Neurological:      Mental Status: He is alert and oriented to person, place, and time.   Psychiatric:         Mood and Affect: Mood and affect normal.         Judgment: Judgment normal.       Ortho Exam      LEFT KNEE: Good strength to hamstrings, quadriceps, dorsiflexors and plantar flexors. Calf supple, non-tender. No swelling, skin discoloration or atrophy. Full extension and flexion. Stable to varus/valgus stress. Negative Lachman. Mildly tender medial joint line. Non-tender lateral joint line. Skin intact. Sensation grossly intact. Neurovascular intact. Full weight bearing. Non-antalgic gait. Dorsal Pedal Pulse 2+, posterior tibialis pulse 2+.       Procedures        Imaging Results (Most Recent)     Procedure Component Value Units Date/Time    SCANNED - IMAGING [[id number]] Resulted: 08/11/21     Updated: 08/02/21 1359           Result Review :       7/28/21 [facility] MRI KNEE WO CONTRAT LEFT IMPRESSION: 1. Normal menisci 2. No cruciate or collateral ligament tears. 3. Fluid signal along the posteromedial aspect of the proximal tibia, deep to the distal torsion of the medial collateral ligament complex and pes aserine tendons. This may represent inflammation of the deep MCL bursa or pes anserine bursa. 5. Thin, 4 cm popliteal cyst.           Assessment and Plan     DX: Left knee pain  Popliteal cyst, left  Mild to moderate left knee osteoarthritis     Discussed treatment plans and diagnosis with the patient. Patient's left knee pain has resolved and he will continue activities of daily living as tolerated. If his symptoms return he will follow-up with us.     Call or return if worsening symptoms.    Follow Up     PRN.       Patient was given instructions and counseling regarding his condition or for health maintenance advice. Please see specific information pulled into the AVS if appropriate.     Scribed for Donn Hogan MD by Kathy Elizondo.  08/11/21    07:48 EDT    I have personally performed the services described in this document as scribed by the above individual and it is both accurate and complete. Donn Hogan MD 08/11/21

## 2021-09-20 ENCOUNTER — LAB (OUTPATIENT)
Dept: LAB | Facility: HOSPITAL | Age: 61
End: 2021-09-20

## 2021-09-20 ENCOUNTER — TRANSCRIBE ORDERS (OUTPATIENT)
Dept: LAB | Facility: HOSPITAL | Age: 61
End: 2021-09-20

## 2021-09-20 DIAGNOSIS — Z79.899 ENCOUNTER FOR LONG-TERM (CURRENT) USE OF OTHER MEDICATIONS: Primary | ICD-10-CM

## 2021-09-20 DIAGNOSIS — I10 ESSENTIAL HYPERTENSION, MALIGNANT: ICD-10-CM

## 2021-09-20 DIAGNOSIS — Z79.899 ENCOUNTER FOR LONG-TERM (CURRENT) USE OF OTHER MEDICATIONS: ICD-10-CM

## 2021-09-20 LAB
ALBUMIN SERPL-MCNC: 4 G/DL (ref 3.5–5.2)
ALBUMIN/GLOB SERPL: 1.6 G/DL
ALP SERPL-CCNC: 72 U/L (ref 39–117)
ALT SERPL W P-5'-P-CCNC: 22 U/L (ref 1–41)
ANION GAP SERPL CALCULATED.3IONS-SCNC: 9.3 MMOL/L (ref 5–15)
AST SERPL-CCNC: 18 U/L (ref 1–40)
BASOPHILS # BLD AUTO: 0.03 10*3/MM3 (ref 0–0.2)
BASOPHILS NFR BLD AUTO: 0.5 % (ref 0–1.5)
BILIRUB SERPL-MCNC: 0.6 MG/DL (ref 0–1.2)
BUN SERPL-MCNC: 18 MG/DL (ref 8–23)
BUN/CREAT SERPL: 18.6 (ref 7–25)
CALCIUM SPEC-SCNC: 8.6 MG/DL (ref 8.6–10.5)
CHLORIDE SERPL-SCNC: 107 MMOL/L (ref 98–107)
CHOLEST SERPL-MCNC: 166 MG/DL (ref 0–200)
CO2 SERPL-SCNC: 24.7 MMOL/L (ref 22–29)
CREAT SERPL-MCNC: 0.97 MG/DL (ref 0.76–1.27)
DEPRECATED RDW RBC AUTO: 41.7 FL (ref 37–54)
EOSINOPHIL # BLD AUTO: 0 10*3/MM3 (ref 0–0.4)
EOSINOPHIL NFR BLD AUTO: 0 % (ref 0.3–6.2)
ERYTHROCYTE [DISTWIDTH] IN BLOOD BY AUTOMATED COUNT: 13.2 % (ref 12.3–15.4)
GFR SERPL CREATININE-BSD FRML MDRD: 78 ML/MIN/1.73
GLOBULIN UR ELPH-MCNC: 2.5 GM/DL
GLUCOSE SERPL-MCNC: 90 MG/DL (ref 65–99)
HCT VFR BLD AUTO: 43.1 % (ref 37.5–51)
HDLC SERPL-MCNC: 48 MG/DL (ref 40–60)
HGB BLD-MCNC: 14.2 G/DL (ref 13–17.7)
IMM GRANULOCYTES # BLD AUTO: 0.02 10*3/MM3 (ref 0–0.05)
IMM GRANULOCYTES NFR BLD AUTO: 0.3 % (ref 0–0.5)
LDLC SERPL CALC-MCNC: 101 MG/DL (ref 0–100)
LDLC/HDLC SERPL: 2.08 {RATIO}
LYMPHOCYTES # BLD AUTO: 1.27 10*3/MM3 (ref 0.7–3.1)
LYMPHOCYTES NFR BLD AUTO: 21 % (ref 19.6–45.3)
MCH RBC QN AUTO: 28 PG (ref 26.6–33)
MCHC RBC AUTO-ENTMCNC: 32.9 G/DL (ref 31.5–35.7)
MCV RBC AUTO: 84.8 FL (ref 79–97)
MONOCYTES # BLD AUTO: 0.46 10*3/MM3 (ref 0.1–0.9)
MONOCYTES NFR BLD AUTO: 7.6 % (ref 5–12)
NEUTROPHILS NFR BLD AUTO: 4.27 10*3/MM3 (ref 1.7–7)
NEUTROPHILS NFR BLD AUTO: 70.6 % (ref 42.7–76)
NRBC BLD AUTO-RTO: 0 /100 WBC (ref 0–0.2)
PLATELET # BLD AUTO: 172 10*3/MM3 (ref 140–450)
PMV BLD AUTO: 11.2 FL (ref 6–12)
POTASSIUM SERPL-SCNC: 3.9 MMOL/L (ref 3.5–5.2)
PROT SERPL-MCNC: 6.5 G/DL (ref 6–8.5)
RBC # BLD AUTO: 5.08 10*6/MM3 (ref 4.14–5.8)
SODIUM SERPL-SCNC: 141 MMOL/L (ref 136–145)
TRIGL SERPL-MCNC: 90 MG/DL (ref 0–150)
VLDLC SERPL-MCNC: 17 MG/DL (ref 5–40)
WBC # BLD AUTO: 6.05 10*3/MM3 (ref 3.4–10.8)

## 2021-09-20 PROCEDURE — 80061 LIPID PANEL: CPT

## 2021-09-20 PROCEDURE — 36415 COLL VENOUS BLD VENIPUNCTURE: CPT

## 2021-09-20 PROCEDURE — 85025 COMPLETE CBC W/AUTO DIFF WBC: CPT

## 2021-09-20 PROCEDURE — 80053 COMPREHEN METABOLIC PANEL: CPT

## 2021-09-23 PROBLEM — I48.91 ATRIAL FIBRILLATION (HCC): Chronic | Status: ACTIVE | Noted: 2021-04-08

## 2021-09-23 PROBLEM — I10 BENIGN ESSENTIAL HYPERTENSION: Status: ACTIVE | Noted: 2021-09-23

## 2021-09-23 PROBLEM — I48.91 ATRIAL FIBRILLATION: Status: ACTIVE | Noted: 2021-09-23

## 2021-09-23 RX ORDER — METOPROLOL TARTRATE 50 MG/1
50 TABLET, FILM COATED ORAL 2 TIMES DAILY
Qty: 180 TABLET | Refills: 3 | OUTPATIENT
Start: 2021-09-23

## 2021-09-23 RX ORDER — METOPROLOL SUCCINATE 50 MG/1
TABLET, EXTENDED RELEASE ORAL
Qty: 75 TABLET | Refills: 7 | Status: SHIPPED | OUTPATIENT
Start: 2021-09-23 | End: 2021-09-27

## 2021-09-23 RX ORDER — METOPROLOL SUCCINATE 50 MG/1
50 TABLET, EXTENDED RELEASE ORAL 2 TIMES DAILY
COMMUNITY
Start: 2021-07-28 | End: 2021-09-23 | Stop reason: SDUPTHER

## 2021-09-23 NOTE — TELEPHONE ENCOUNTER
Patient last seen on 05.24.21. Medication was document at that visit and increased to 1 qam and  1-1/2 qhs      Next OV   09.27.21

## 2021-09-27 ENCOUNTER — OFFICE VISIT (OUTPATIENT)
Dept: CARDIOLOGY | Facility: CLINIC | Age: 61
End: 2021-09-27

## 2021-09-27 VITALS
DIASTOLIC BLOOD PRESSURE: 85 MMHG | HEART RATE: 58 BPM | HEIGHT: 70 IN | WEIGHT: 273 LBS | BODY MASS INDEX: 39.08 KG/M2 | SYSTOLIC BLOOD PRESSURE: 139 MMHG | TEMPERATURE: 98 F | OXYGEN SATURATION: 98 %

## 2021-09-27 DIAGNOSIS — I48.19 PERSISTENT ATRIAL FIBRILLATION (HCC): Primary | Chronic | ICD-10-CM

## 2021-09-27 DIAGNOSIS — E78.5 HYPERLIPIDEMIA LDL GOAL <100: ICD-10-CM

## 2021-09-27 DIAGNOSIS — I10 BENIGN ESSENTIAL HYPERTENSION: Chronic | ICD-10-CM

## 2021-09-27 PROCEDURE — 93000 ELECTROCARDIOGRAM COMPLETE: CPT | Performed by: INTERNAL MEDICINE

## 2021-09-27 PROCEDURE — 99214 OFFICE O/P EST MOD 30 MIN: CPT | Performed by: INTERNAL MEDICINE

## 2021-09-27 RX ORDER — MULTIPLE VITAMINS W/ MINERALS TAB 9MG-400MCG
1 TAB ORAL DAILY
COMMUNITY

## 2021-09-27 RX ORDER — SILDENAFIL CITRATE 20 MG/1
TABLET ORAL
COMMUNITY
Start: 2021-09-13 | End: 2022-04-25 | Stop reason: SDUPTHER

## 2021-09-27 RX ORDER — METOPROLOL SUCCINATE 50 MG/1
TABLET, EXTENDED RELEASE ORAL
Qty: 180 TABLET | Refills: 3 | Status: SHIPPED | OUTPATIENT
Start: 2021-09-27 | End: 2022-10-26

## 2021-09-27 RX ORDER — LOSARTAN POTASSIUM 25 MG/1
25 TABLET ORAL EVERY MORNING
COMMUNITY
Start: 2021-09-10 | End: 2021-09-27 | Stop reason: SDUPTHER

## 2021-09-27 RX ORDER — ATORVASTATIN CALCIUM 10 MG/1
10 TABLET, FILM COATED ORAL NIGHTLY
Qty: 90 TABLET | Refills: 3 | Status: SHIPPED | OUTPATIENT
Start: 2021-09-27 | End: 2022-09-01

## 2021-09-27 RX ORDER — LOSARTAN POTASSIUM 25 MG/1
25 TABLET ORAL EVERY MORNING
Qty: 90 TABLET | Refills: 3 | Status: SHIPPED | OUTPATIENT
Start: 2021-09-27 | End: 2022-09-19

## 2021-09-27 RX ORDER — TRIAMTERENE AND HYDROCHLOROTHIAZIDE 37.5; 25 MG/1; MG/1
1 TABLET ORAL DAILY
COMMUNITY
Start: 2021-09-24 | End: 2021-09-27 | Stop reason: DRUGHIGH

## 2021-09-27 RX ORDER — AMLODIPINE BESYLATE 5 MG/1
5 TABLET ORAL DAILY
COMMUNITY
Start: 2021-09-10 | End: 2022-05-02

## 2021-09-27 NOTE — ASSESSMENT & PLAN NOTE
He has stayed in sinus rhythm symptomatically.  He complains of being fatigued and tired by the end of the day.  I am going to reduce his Toprol to 50 twice daily.  At the time of his next visit if he still doing okay and staying in sinus rhythm we may consider a 30-day event monitor before taking off his Eliquis

## 2021-09-27 NOTE — PROGRESS NOTES
Chief Complaint  Follow-up (4 MONTH /AFIB)    Subjective            Jerman Caro presents to [facility] CARDIOLOGY  Mr. Rodriguez is a 61 years old gentleman with hypertension 1 episode of paroxysmal atrial fibrillation who is doing well.  He states he does not find time to exercise because of his work schedule.  I have encouraged him to find time and he promises to do so.  I recommended that he walk 20 to 30 minutes 5 days a week minimum.  He has been at home blood pressure readings are little bit elevated.  He denies chest pain palpitations dizziness or syncope      Past Medical History:   Diagnosis Date   • Atrial fibrillation (CMS/HCC)    • Chronic allergic rhinitis    • Colon cancer screening 03/17/2021    POSITIVE- REFERRED FOR COLONOSCOPY   • Coronary artery disease     NEW ONSET OF A-FIB CORRECTED BY CARDIOVERSION 5/2021   • Essential hypertension     BENIGN    • Hypertension    • Sleep apnea     USES CPAP       No Known Allergies     Past Surgical History:   Procedure Laterality Date   • COLONOSCOPY N/A 6/15/2021    Procedure: COLONOSCOPY w/ bx;  Surgeon: Boris Isaac MD;  Location: [facility] ENDOSCOPY;  Service: General;  Laterality: N/A;  colon polyp        Social History     Tobacco Use   • Smoking status: Never Smoker   • Smokeless tobacco: Never Used   Vaping Use   • Vaping Use: Never used   Substance Use Topics   • Alcohol use: Never     Comment: VERY RARELY   • Drug use: Never       Family History   Problem Relation Age of Onset   • Hypertension Mother    • Heart disease Father    • Hypertension Father    • Hypertension Brother         Prior to Admission medications    Medication Sig Start Date End Date Taking? Authorizing Provider   amLODIPine (NORVASC) 5 MG tablet Take 5 mg by mouth Daily. 9/10/21  Yes Provider, Historical, MD   apixaban (Eliquis) 5 MG tablet tablet TAKE 1 TABLET BY MOUTH TWO TIMES A DAY 7/7/21  Yes Provider, Historical, MD   cetirizine (ZyrTEC Allergy) 10 MG  tablet Take 10 mg by mouth Daily.   Yes Miah Nguyen MD   losartan (COZAAR) 25 MG tablet Take 1 tablet by mouth Every Morning. 9/27/21  Yes Marcia Hall MD   metoprolol succinate XL (TOPROL-XL) 50 MG 24 hr tablet Take 1 tablet by mouth every morning and  every night. bid 9/27/21  Yes Marcia Hall MD   multivitamin with minerals tablet tablet Take 1 tablet by mouth Daily.   Yes Miah Nguyen MD   sildenafil (REVATIO) 20 MG tablet TAKE 2 TABLETS BY MOUTH ONCE DAILY AS NEEDED FOR ED 9/13/21  Yes Miah Nguyen MD   terazosin (HYTRIN) 2 MG capsule Take 2 mg by mouth Every Night.   Yes Miah Nguyen MD   triamterene-hydrochlorothiazide (DYAZIDE) 37.5-25 MG per capsule Take 1 capsule by mouth Every Other Day.   Yes Miah Nguyen MD   losartan (COZAAR) 25 MG tablet Take 25 mg by mouth Every Morning. 9/10/21 9/27/21 Yes Miah Nguyen MD   metoprolol succinate XL (TOPROL-XL) 50 MG 24 hr tablet Take 1 tablet by mouth every morning and 1-1/2 tablets by mouth every night. 9/23/21 9/27/21 Yes Marcia Hall MD   atorvastatin (LIPITOR) 10 MG tablet Take 1 tablet by mouth Every Night. 9/27/21   Marcia Hall MD   AMLODIPINE BESYLATE PO Take 5 mg by mouth Daily.  9/27/21  Miah Nguyen MD   apixaban (Eliquis) 5 MG tablet tablet Take 5 mg by mouth 2 (Two) Times a Day. LAST ALMONTE TAKEN Friday June 11, 2021 PM  9/27/21  Miah Nguyen MD   LOSARTAN POTASSIUM PO Take 12.5 mg by mouth Daily.  9/27/21  Miah Nguyen MD   multivitamin with minerals tablet tablet Take 1 tablet by mouth Daily.  9/27/21  Miah Nguyen MD   triamterene-hydrochlorothiazide (MAXZIDE-25) 37.5-25 MG per tablet Take 1 tablet by mouth Daily. for blood pressure. 9/24/21 9/27/21  Miah Nguyen MD        Review of Systems   Respiratory: Negative for cough and shortness of breath.    Cardiovascular: Negative for chest pain, palpitations and leg swelling.        Objective  "    /85 (BP Location: Left arm, Patient Position: Sitting)   Pulse 58   Temp 98 °F (36.7 °C)   Ht 177.8 cm (70\")   Wt 124 kg (273 lb)   SpO2 98%   BMI 39.17 kg/m²       Physical Exam  Constitutional:       General: He is awake.      Appearance: Normal appearance.   Neck:      Thyroid: No thyromegaly.      Vascular: No carotid bruit or JVD.   Cardiovascular:      Rate and Rhythm: Normal rate and regular rhythm.      Chest Wall: PMI is not displaced.      Pulses: Normal pulses.      Heart sounds: Normal heart sounds, S1 normal and S2 normal. No murmur heard.   No friction rub. No gallop. No S3 or S4 sounds.    Pulmonary:      Effort: Pulmonary effort is normal.      Breath sounds: Normal breath sounds and air entry. No wheezing, rhonchi or rales.   Abdominal:      General: Bowel sounds are normal.      Palpations: Abdomen is soft. There is no mass.      Tenderness: There is no abdominal tenderness.   Musculoskeletal:      Cervical back: Neck supple.      Right lower leg: No edema.      Left lower leg: No edema.   Neurological:      Mental Status: He is alert and oriented to person, place, and time.   Psychiatric:         Mood and Affect: Mood normal.         Behavior: Behavior is cooperative.       No results found for: PROBNP, BNP  CMP    CMP 5/3/21 5/18/21 5/18/21 9/20/21     1204 1204    Glucose    90   Glucose 98 106 (A) 105 (A)    BUN 11 11 11 18   Creatinine 1.16 1.03 1.00 0.97   eGFR Non African Am    78   Sodium 142 141 141 141   Potassium 3.5 4.1 3.9 3.9   Chloride 107 104 104 107   Calcium 8.9 9.5 9.4 8.6   Albumin 4.0 4.3  4.00   Total Bilirubin 0.51 0.60  0.6   Alkaline Phosphatase 65 76  72   AST (SGOT) 19 21  18   ALT (SGPT) 26 29  22   (A) Abnormal value            CBC w/diff    CBC w/Diff 5/3/21 5/18/21 9/20/21   WBC 6.12 6.02 6.05   RBC 5.43 5.74 5.08   Hemoglobin 15.0 16.0 14.2   Hematocrit 46.1 49.0 43.1   MCV 84.9 85.4 84.8   MCH 27.6 27.9 28.0   MCHC 32.5 (A) 32.7 (A) 32.9   RDW 14.3 " 14.0 13.2   Platelets 215 204 172   Neutrophil Rel % 72.1 73.8 70.6   Immature Granulocyte Rel %   0.3   Lymphocyte Rel % 20.6 19.8 (A) 21.0   Monocyte Rel % 6.4 5.8 7.6   Eosinophil Rel % 0.0 0.0 0.0 (A)   Basophil Rel % 0.7 0.3 0.5   (A) Abnormal value             Lipid Panel    Lipid Panel 3/3/21 5/3/21 9/20/21   Total Cholesterol   166   Total Cholesterol 175 154    Triglycerides 122 116 90   HDL Cholesterol 59 45 48   VLDL Cholesterol 24 23 17   LDL Cholesterol  92 86 101 (A)   LDL/HDL Ratio   2.08   (A) Abnormal value       Comments are available for some flowsheets but are not being displayed.            Lab Results   Component Value Date    TSH 2.470 06/17/2020    TSH 1.290 11/06/2019    TSH 1.130 03/13/2019      No results found for: FREET4   No results found for: DDIMERQUANT  No results found for: MG   No results found for: DIGOXIN   A1C Last 3 Results    HGBA1C Last 3 Results 11/16/20 3/3/21   Hemoglobin A1C 6.5 (A) 5.4   (A) Abnormal value       Comments are available for some flowsheets but are not being displayed.                  Result Review :                    ECG 12 Lead    Date/Time: 9/27/2021 9:39 AM  Performed by: Marcia Hall MD  Authorized by: Marcia Hall MD   Comments: Sinus rhythm normal axis no significant abnormality noted.  No change compared to previous EKG of 5/24/2021                Assessment and Plan        Diagnoses and all orders for this visit:    1. Persistent atrial fibrillation (CMS/HCC) (Primary)  Assessment & Plan:  He has stayed in sinus rhythm symptomatically.  He complains of being fatigued and tired by the end of the day.  I am going to reduce his Toprol to 50 twice daily.  At the time of his next visit if he still doing okay and staying in sinus rhythm we may consider a 30-day event monitor before taking off his Eliquis    Orders:  -     Lipid Panel; Future  -     Comprehensive Metabolic Panel; Future  -     Magnesium; Future  -     Lipid Panel; Future  -      Comprehensive Metabolic Panel; Future  -     Magnesium; Future    2. Benign essential hypertension  Assessment & Plan:  Hypertension is not quite at goal.  I am going to increase his losartan to 25 mg a day and after 2 weeks he will do a 2-week blood pressure log.    Orders:  -     Lipid Panel; Future  -     Comprehensive Metabolic Panel; Future  -     Magnesium; Future  -     Lipid Panel; Future  -     Comprehensive Metabolic Panel; Future  -     Magnesium; Future    3. Hyperlipidemia LDL goal <100  Assessment & Plan:  Lipid abnormalities are not quite at goal.  Going to start him on atorvastatin 10 mg at bedtime    Orders:  -     Lipid Panel; Future  -     Comprehensive Metabolic Panel; Future  -     Magnesium; Future  -     Lipid Panel; Future  -     Comprehensive Metabolic Panel; Future  -     Magnesium; Future    Other orders  -     metoprolol succinate XL (TOPROL-XL) 50 MG 24 hr tablet; Take 1 tablet by mouth every morning and  every night. bid  Dispense: 180 tablet; Refill: 3  -     losartan (COZAAR) 25 MG tablet; Take 1 tablet by mouth Every Morning.  Dispense: 90 tablet; Refill: 3  -     atorvastatin (LIPITOR) 10 MG tablet; Take 1 tablet by mouth Every Night.  Dispense: 90 tablet; Refill: 3  -     ECG 12 Lead    He will do a 2-week blood pressure log after being on the current changed regimen for 2 weeks.      Follow Up     Return in about 6 months (around 3/27/2022) for ekg with ov.    Patient was given instructions and counseling regarding his condition or for health maintenance advice. Please see specific information pulled into the AVS if appropriate.

## 2021-09-27 NOTE — ASSESSMENT & PLAN NOTE
Hypertension is not quite at goal.  I am going to increase his losartan to 25 mg a day and after 2 weeks he will do a 2-week blood pressure log.

## 2021-10-25 ENCOUNTER — OFFICE VISIT (OUTPATIENT)
Dept: FAMILY MEDICINE CLINIC | Facility: CLINIC | Age: 61
End: 2021-10-25

## 2021-10-25 VITALS
DIASTOLIC BLOOD PRESSURE: 88 MMHG | WEIGHT: 279.2 LBS | OXYGEN SATURATION: 99 % | HEART RATE: 65 BPM | HEIGHT: 70 IN | TEMPERATURE: 97.6 F | BODY MASS INDEX: 39.97 KG/M2 | RESPIRATION RATE: 17 BRPM | SYSTOLIC BLOOD PRESSURE: 139 MMHG

## 2021-10-25 DIAGNOSIS — Z23 NEED FOR INFLUENZA VACCINATION: Primary | ICD-10-CM

## 2021-10-25 DIAGNOSIS — I48.19 PERSISTENT ATRIAL FIBRILLATION (HCC): Chronic | ICD-10-CM

## 2021-10-25 DIAGNOSIS — E78.5 HYPERLIPIDEMIA LDL GOAL <100: ICD-10-CM

## 2021-10-25 DIAGNOSIS — I25.10 CORONARY ARTERY DISEASE INVOLVING NATIVE CORONARY ARTERY OF NATIVE HEART WITHOUT ANGINA PECTORIS: ICD-10-CM

## 2021-10-25 DIAGNOSIS — I10 BENIGN ESSENTIAL HYPERTENSION: ICD-10-CM

## 2021-10-25 PROBLEM — J30.9 ALLERGIC RHINITIS: Status: ACTIVE | Noted: 2021-10-25

## 2021-10-25 PROBLEM — Z12.11 COLON CANCER SCREENING: Status: ACTIVE | Noted: 2021-03-17

## 2021-10-25 PROCEDURE — 90686 IIV4 VACC NO PRSV 0.5 ML IM: CPT | Performed by: FAMILY MEDICINE

## 2021-10-25 PROCEDURE — 99214 OFFICE O/P EST MOD 30 MIN: CPT | Performed by: FAMILY MEDICINE

## 2021-10-25 PROCEDURE — 90471 IMMUNIZATION ADMIN: CPT | Performed by: FAMILY MEDICINE

## 2021-10-25 RX ORDER — TERAZOSIN 2 MG/1
2 CAPSULE ORAL 2 TIMES DAILY
Qty: 180 CAPSULE | Refills: 3 | Status: SHIPPED | OUTPATIENT
Start: 2021-10-25 | End: 2022-11-10

## 2021-10-25 NOTE — PROGRESS NOTES
Chief Complaint   Patient presents with   • Follow-up     6 mo f/u        Subjective     Jerman Caro  has a past medical history of Atrial fibrillation (HCC), Colon cancer screening (03/17/2021), Coronary artery disease, and Sleep apnea.    Hypertension-he has been checking his blood pressure at home.  They have mostly been pretty good typically in the 1 2130s over 70s to 80s.  He is taking his medication on a regular basis.    Hyperlipidemia-when he last saw Dr. aHll he added him on atorvastatin 10 mg daily.    Atrial fibrillation-he states since our last for visit he had a cardioversion.  He denies any palpitations or tachycardia.  He is still on the Eliquis twice daily.    BPH-he still has some intermittent difficulties urinating.  One time he took it twice a day and noticed an improvement in his urine stream throughout the day.      PHQ-2 Depression Screening  Little interest or pleasure in doing things? 0   Feeling down, depressed, or hopeless? 0   PHQ-2 Total Score 0   PHQ-9 Depression Screening  Little interest or pleasure in doing things? 0   Feeling down, depressed, or hopeless? 0   Trouble falling or staying asleep, or sleeping too much?     Feeling tired or having little energy?     Poor appetite or overeating?     Feeling bad about yourself - or that you are a failure or have let yourself or your family down?     Trouble concentrating on things, such as reading the newspaper or watching television?     Moving or speaking so slowly that other people could have noticed? Or the opposite - being so fidgety or restless that you have been moving around a lot more than usual?     Thoughts that you would be better off dead, or of hurting yourself in some way?     PHQ-9 Total Score 0   If you checked off any problems, how difficult have these problems made it for you to do your work, take care of things at home, or get along with other people?       No Known Allergies    Prior to Admission medications     Medication Sig Start Date End Date Taking? Authorizing Provider   amLODIPine (NORVASC) 5 MG tablet Take 5 mg by mouth Daily. 9/10/21  Yes Miah Nguyen MD   apixaban (Eliquis) 5 MG tablet tablet TAKE 1 TABLET BY MOUTH TWO TIMES A DAY 7/7/21  Yes Miah Nguyen MD   atorvastatin (LIPITOR) 10 MG tablet Take 1 tablet by mouth Every Night. 9/27/21  Yes Marcia Hall MD   cetirizine (ZyrTEC Allergy) 10 MG tablet Take 10 mg by mouth Daily.   Yes Miah Nguyen MD   losartan (COZAAR) 25 MG tablet Take 1 tablet by mouth Every Morning. 9/27/21  Yes Marcia Hall MD   metoprolol succinate XL (TOPROL-XL) 50 MG 24 hr tablet Take 1 tablet by mouth every morning and  every night. bid 9/27/21  Yes Marcia Hall MD   multivitamin with minerals tablet tablet Take 1 tablet by mouth Daily.   Yes Miah Nguyen MD   sildenafil (REVATIO) 20 MG tablet TAKE 2 TABLETS BY MOUTH ONCE DAILY AS NEEDED FOR ED 9/13/21  Yes Miah Nguyen MD   terazosin (HYTRIN) 2 MG capsule Take 2 mg by mouth Every Night.   Yes Miah Nguyen MD   triamterene-hydrochlorothiazide (DYAZIDE) 37.5-25 MG per capsule Take 1 capsule by mouth Every Other Day.   Yes Miah Nguyen MD        Patient Active Problem List   Diagnosis   • Positive colorectal cancer screening using Cologuard test   • Atrial fibrillation (HCC)   • Benign essential hypertension   • Hyperlipidemia LDL goal <100   • Colon cancer screening   • Allergic rhinitis   • Coronary artery disease   • Benign prostatic hyperplasia with weak urinary stream   • Need for influenza vaccination        Past Surgical History:   Procedure Laterality Date   • COLONOSCOPY N/A 6/15/2021    Procedure: COLONOSCOPY w/ bx;  Surgeon: Boris Isaac MD;  Location: [facility] ENDOSCOPY;  Service: General;  Laterality: N/A;  colon polyp       Social History     Socioeconomic History   • Marital status:    Tobacco Use   • Smoking status: Never Smoker   •  "Smokeless tobacco: Never Used   Vaping Use   • Vaping Use: Never used   Substance and Sexual Activity   • Alcohol use: Never     Comment: VERY RARELY   • Drug use: Never   • Sexual activity: Defer       Family History   Problem Relation Age of Onset   • Hypertension Mother    • Heart disease Father    • Hypertension Father    • Hypertension Brother        Family history, surgical history, past medical history, Allergies and med's reviewed with patient today and updated in [facility] EMR.     ROS:  Review of Systems   Constitutional: Negative for fatigue.   HENT: Negative for congestion, nosebleeds, postnasal drip and rhinorrhea.    Eyes: Negative for blurred vision and visual disturbance.   Respiratory: Negative for cough, chest tightness, shortness of breath and wheezing.    Cardiovascular: Negative for chest pain and palpitations.   Gastrointestinal: Negative for abdominal pain, blood in stool, constipation and diarrhea.   Genitourinary: Negative for hematuria.   Neurological: Negative for headache.   Psychiatric/Behavioral: Negative for depressed mood. The patient is not nervous/anxious.        OBJECTIVE:  Vitals:    10/25/21 0837   BP: 139/88   Pulse: 65   Resp: 17   Temp: 97.6 °F (36.4 °C)   TempSrc: Temporal   SpO2: 99%   Weight: 127 kg (279 lb 3.2 oz)   Height: 177.8 cm (70\")     No exam data present   Body mass index is 40.06 kg/m².  No LMP for male patient.    Physical Exam  Vitals and nursing note reviewed.   Constitutional:       General: He is not in acute distress.     Appearance: Normal appearance. He is obese.   HENT:      Head: Normocephalic.      Right Ear: Tympanic membrane, ear canal and external ear normal.      Left Ear: Tympanic membrane, ear canal and external ear normal.      Nose: Nose normal.      Mouth/Throat:      Mouth: Mucous membranes are moist.      Pharynx: Oropharynx is clear.   Eyes:      General: No scleral icterus.     Conjunctiva/sclera: Conjunctivae normal.      Pupils: Pupils are " equal, round, and reactive to light.   Cardiovascular:      Rate and Rhythm: Normal rate and regular rhythm.      Pulses: Normal pulses.      Heart sounds: Normal heart sounds. No murmur heard.      Pulmonary:      Effort: Pulmonary effort is normal.      Breath sounds: Normal breath sounds. No wheezing, rhonchi or rales.   Musculoskeletal:      Cervical back: No rigidity or tenderness.   Lymphadenopathy:      Cervical: No cervical adenopathy.   Skin:     General: Skin is warm and dry.      Coloration: Skin is not jaundiced.      Findings: No rash.   Neurological:      General: No focal deficit present.      Mental Status: He is alert and oriented to person, place, and time.      Gait: Gait normal.   Psychiatric:         Mood and Affect: Mood normal.         Thought Content: Thought content normal.         Judgment: Judgment normal.         Procedures    No visits with results within 30 Day(s) from this visit.   Latest known visit with results is:   Lab on 09/20/2021   Component Date Value Ref Range Status   • Glucose 09/20/2021 90  65 - 99 mg/dL Final   • BUN 09/20/2021 18  8 - 23 mg/dL Final   • Creatinine 09/20/2021 0.97  0.76 - 1.27 mg/dL Final   • Sodium 09/20/2021 141  136 - 145 mmol/L Final   • Potassium 09/20/2021 3.9  3.5 - 5.2 mmol/L Final   • Chloride 09/20/2021 107  98 - 107 mmol/L Final   • CO2 09/20/2021 24.7  22.0 - 29.0 mmol/L Final   • Calcium 09/20/2021 8.6  8.6 - 10.5 mg/dL Final   • Total Protein 09/20/2021 6.5  6.0 - 8.5 g/dL Final   • Albumin 09/20/2021 4.00  3.50 - 5.20 g/dL Final   • ALT (SGPT) 09/20/2021 22  1 - 41 U/L Final   • AST (SGOT) 09/20/2021 18  1 - 40 U/L Final   • Alkaline Phosphatase 09/20/2021 72  39 - 117 U/L Final   • Total Bilirubin 09/20/2021 0.6  0.0 - 1.2 mg/dL Final   • eGFR Non African Amer 09/20/2021 78  >60 mL/min/1.73 Final   • Globulin 09/20/2021 2.5  gm/dL Final   • A/G Ratio 09/20/2021 1.6  g/dL Final   • BUN/Creatinine Ratio 09/20/2021 18.6  7.0 - 25.0 Final   •  Anion Gap 09/20/2021 9.3  5.0 - 15.0 mmol/L Final   • Total Cholesterol 09/20/2021 166  0 - 200 mg/dL Final   • Triglycerides 09/20/2021 90  0 - 150 mg/dL Final   • HDL Cholesterol 09/20/2021 48  40 - 60 mg/dL Final   • LDL Cholesterol  09/20/2021 101* 0 - 100 mg/dL Final   • VLDL Cholesterol 09/20/2021 17  5 - 40 mg/dL Final   • LDL/HDL Ratio 09/20/2021 2.08   Final   • WBC 09/20/2021 6.05  3.40 - 10.80 10*3/mm3 Final   • RBC 09/20/2021 5.08  4.14 - 5.80 10*6/mm3 Final   • Hemoglobin 09/20/2021 14.2  13.0 - 17.7 g/dL Final   • Hematocrit 09/20/2021 43.1  37.5 - 51.0 % Final   • MCV 09/20/2021 84.8  79.0 - 97.0 fL Final   • MCH 09/20/2021 28.0  26.6 - 33.0 pg Final   • MCHC 09/20/2021 32.9  31.5 - 35.7 g/dL Final   • RDW 09/20/2021 13.2  12.3 - 15.4 % Final   • RDW-SD 09/20/2021 41.7  37.0 - 54.0 fl Final   • MPV 09/20/2021 11.2  6.0 - 12.0 fL Final   • Platelets 09/20/2021 172  140 - 450 10*3/mm3 Final   • Neutrophil % 09/20/2021 70.6  42.7 - 76.0 % Final   • Lymphocyte % 09/20/2021 21.0  19.6 - 45.3 % Final   • Monocyte % 09/20/2021 7.6  5.0 - 12.0 % Final   • Eosinophil % 09/20/2021 0.0* 0.3 - 6.2 % Final   • Basophil % 09/20/2021 0.5  0.0 - 1.5 % Final   • Immature Grans % 09/20/2021 0.3  0.0 - 0.5 % Final   • Neutrophils, Absolute 09/20/2021 4.27  1.70 - 7.00 10*3/mm3 Final   • Lymphocytes, Absolute 09/20/2021 1.27  0.70 - 3.10 10*3/mm3 Final   • Monocytes, Absolute 09/20/2021 0.46  0.10 - 0.90 10*3/mm3 Final   • Eosinophils, Absolute 09/20/2021 0.00  0.00 - 0.40 10*3/mm3 Final   • Basophils, Absolute 09/20/2021 0.03  0.00 - 0.20 10*3/mm3 Final   • Immature Grans, Absolute 09/20/2021 0.02  0.00 - 0.05 10*3/mm3 Final   • nRBC 09/20/2021 0.0  0.0 - 0.2 /100 WBC Final       ASSESSMENT/ PLAN:    Diagnoses and all orders for this visit:    1. Persistent atrial fibrillation (HCC) (Primary)  Assessment & Plan:  Clinically today he sounds like he is in a normal sinus rhythm.  He sees Dr. Hall again in 6 months unless  he has any issues.      2. Benign essential hypertension  Assessment & Plan:  His blood pressure for the most part is pretty good.  We'll continue his current meds.    Orders:  -     Hepatic Function Panel; Future  -     Lipid Panel; Future    3. Hyperlipidemia LDL goal <100  Assessment & Plan:  He has been on his atorvastatin almost 4 weeks now.  We'll put in an order for him to repeat his lipids and liver function test in another 2 weeks.    Orders:  -     Hepatic Function Panel; Future  -     Lipid Panel; Future    4. Coronary artery disease involving native coronary artery of native heart without angina pectoris  Assessment & Plan:  He is doing well and denies any current anginal-like symptoms.    Orders:  -     Hepatic Function Panel; Future  -     Lipid Panel; Future    5. Need for influenza vaccination    Other orders  -     FluLaval/Fluarix/Fluzone >6 Months (6867-7515); Future  -     terazosin (HYTRIN) 2 MG capsule; Take 1 capsule by mouth 2 (two) times a day for 90 days.  Dispense: 180 capsule; Refill: 3      Orders Placed Today:     New Medications Ordered This Visit   Medications   • terazosin (HYTRIN) 2 MG capsule     Sig: Take 1 capsule by mouth 2 (two) times a day for 90 days.     Dispense:  180 capsule     Refill:  3        Management Plan:     An After Visit Summary was printed and given to the patient at discharge.    Follow-up: No follow-ups on file.    Andrew Estrada DO 10/25/2021 09:10 EDT  This note was electronically signed.

## 2021-10-25 NOTE — ASSESSMENT & PLAN NOTE
He has been on his atorvastatin almost 4 weeks now.  We'll put in an order for him to repeat his lipids and liver function test in another 2 weeks.

## 2021-10-25 NOTE — ASSESSMENT & PLAN NOTE
Clinically today he sounds like he is in a normal sinus rhythm.  He sees Dr. Hall again in 6 months unless he has any issues.

## 2021-10-25 NOTE — ASSESSMENT & PLAN NOTE
He still has some urinary frequency.  We'll increase up his Terazosin the twice daily.  Is already monitoring his blood pressure so he just needs to watch her closer with the medication.

## 2021-12-10 ENCOUNTER — IMMUNIZATION (OUTPATIENT)
Dept: VACCINE CLINIC | Facility: HOSPITAL | Age: 61
End: 2021-12-10

## 2021-12-10 PROCEDURE — 0004A HC ADM SARSCOV2 30MCG/0.3ML BOOSTER: CPT | Performed by: INTERNAL MEDICINE

## 2021-12-10 PROCEDURE — 91300 HC SARSCOV02 VAC 30MCG/0.3ML IM: CPT | Performed by: INTERNAL MEDICINE

## 2022-01-13 ENCOUNTER — PATIENT MESSAGE (OUTPATIENT)
Dept: CARDIOLOGY | Facility: CLINIC | Age: 62
End: 2022-01-13

## 2022-02-01 ENCOUNTER — LAB (OUTPATIENT)
Dept: LAB | Facility: HOSPITAL | Age: 62
End: 2022-02-01

## 2022-02-01 DIAGNOSIS — I25.10 CORONARY ARTERY DISEASE INVOLVING NATIVE CORONARY ARTERY OF NATIVE HEART WITHOUT ANGINA PECTORIS: ICD-10-CM

## 2022-02-01 DIAGNOSIS — E78.5 HYPERLIPIDEMIA LDL GOAL <100: ICD-10-CM

## 2022-02-01 DIAGNOSIS — I48.19 PERSISTENT ATRIAL FIBRILLATION: Chronic | ICD-10-CM

## 2022-02-01 DIAGNOSIS — I10 BENIGN ESSENTIAL HYPERTENSION: ICD-10-CM

## 2022-02-01 LAB
ALBUMIN SERPL-MCNC: 4.2 G/DL (ref 3.5–5.2)
ALBUMIN/GLOB SERPL: 1.7 G/DL
ALP SERPL-CCNC: 97 U/L (ref 39–117)
ALT SERPL W P-5'-P-CCNC: 31 U/L (ref 1–41)
ANION GAP SERPL CALCULATED.3IONS-SCNC: 8.3 MMOL/L (ref 5–15)
AST SERPL-CCNC: 23 U/L (ref 1–40)
BILIRUB CONJ SERPL-MCNC: 0.2 MG/DL (ref 0–0.3)
BILIRUB SERPL-MCNC: 0.6 MG/DL (ref 0–1.2)
BUN SERPL-MCNC: 14 MG/DL (ref 8–23)
BUN/CREAT SERPL: 13.2 (ref 7–25)
CALCIUM SPEC-SCNC: 9.2 MG/DL (ref 8.6–10.5)
CHLORIDE SERPL-SCNC: 101 MMOL/L (ref 98–107)
CHOLEST SERPL-MCNC: 128 MG/DL (ref 0–200)
CO2 SERPL-SCNC: 28.7 MMOL/L (ref 22–29)
CREAT SERPL-MCNC: 1.06 MG/DL (ref 0.76–1.27)
GFR SERPL CREATININE-BSD FRML MDRD: 71 ML/MIN/1.73
GLOBULIN UR ELPH-MCNC: 2.5 GM/DL
GLUCOSE SERPL-MCNC: 98 MG/DL (ref 65–99)
HDLC SERPL-MCNC: 47 MG/DL (ref 40–60)
LDLC SERPL CALC-MCNC: 64 MG/DL (ref 0–100)
LDLC/HDLC SERPL: 1.34 {RATIO}
MAGNESIUM SERPL-MCNC: 2 MG/DL (ref 1.6–2.4)
POTASSIUM SERPL-SCNC: 3.9 MMOL/L (ref 3.5–5.2)
PROT SERPL-MCNC: 6.7 G/DL (ref 6–8.5)
SODIUM SERPL-SCNC: 138 MMOL/L (ref 136–145)
TRIGL SERPL-MCNC: 91 MG/DL (ref 0–150)
VLDLC SERPL-MCNC: 17 MG/DL (ref 5–40)

## 2022-02-01 PROCEDURE — 36415 COLL VENOUS BLD VENIPUNCTURE: CPT

## 2022-02-01 PROCEDURE — 80053 COMPREHEN METABOLIC PANEL: CPT

## 2022-02-01 PROCEDURE — 80061 LIPID PANEL: CPT

## 2022-02-01 PROCEDURE — 82248 BILIRUBIN DIRECT: CPT

## 2022-02-01 PROCEDURE — 83735 ASSAY OF MAGNESIUM: CPT

## 2022-03-21 ENCOUNTER — LAB (OUTPATIENT)
Dept: LAB | Facility: HOSPITAL | Age: 62
End: 2022-03-21

## 2022-03-21 DIAGNOSIS — I10 BENIGN ESSENTIAL HYPERTENSION: Chronic | ICD-10-CM

## 2022-03-21 DIAGNOSIS — I48.19 PERSISTENT ATRIAL FIBRILLATION: Chronic | ICD-10-CM

## 2022-03-21 DIAGNOSIS — E78.5 HYPERLIPIDEMIA LDL GOAL <100: ICD-10-CM

## 2022-03-21 LAB
ALBUMIN SERPL-MCNC: 4.1 G/DL (ref 3.5–5.2)
ALBUMIN/GLOB SERPL: 1.3 G/DL
ALP SERPL-CCNC: 109 U/L (ref 39–117)
ALT SERPL W P-5'-P-CCNC: 61 U/L (ref 1–41)
ANION GAP SERPL CALCULATED.3IONS-SCNC: 9.9 MMOL/L (ref 5–15)
AST SERPL-CCNC: 39 U/L (ref 1–40)
BILIRUB SERPL-MCNC: 0.6 MG/DL (ref 0–1.2)
BUN SERPL-MCNC: 16 MG/DL (ref 8–23)
BUN/CREAT SERPL: 15 (ref 7–25)
CALCIUM SPEC-SCNC: 9.2 MG/DL (ref 8.6–10.5)
CHLORIDE SERPL-SCNC: 105 MMOL/L (ref 98–107)
CHOLEST SERPL-MCNC: 138 MG/DL (ref 0–200)
CO2 SERPL-SCNC: 25.1 MMOL/L (ref 22–29)
CREAT SERPL-MCNC: 1.07 MG/DL (ref 0.76–1.27)
EGFRCR SERPLBLD CKD-EPI 2021: 78 ML/MIN/1.73
GLOBULIN UR ELPH-MCNC: 3.1 GM/DL
GLUCOSE SERPL-MCNC: 95 MG/DL (ref 65–99)
HDLC SERPL-MCNC: 43 MG/DL (ref 40–60)
LDLC SERPL CALC-MCNC: 75 MG/DL (ref 0–100)
LDLC/HDLC SERPL: 1.69 {RATIO}
MAGNESIUM SERPL-MCNC: 2.2 MG/DL (ref 1.6–2.4)
POTASSIUM SERPL-SCNC: 3.7 MMOL/L (ref 3.5–5.2)
PROT SERPL-MCNC: 7.2 G/DL (ref 6–8.5)
SODIUM SERPL-SCNC: 140 MMOL/L (ref 136–145)
TRIGL SERPL-MCNC: 112 MG/DL (ref 0–150)
VLDLC SERPL-MCNC: 20 MG/DL (ref 5–40)

## 2022-03-21 PROCEDURE — 80053 COMPREHEN METABOLIC PANEL: CPT

## 2022-03-21 PROCEDURE — 83735 ASSAY OF MAGNESIUM: CPT

## 2022-03-21 PROCEDURE — 80061 LIPID PANEL: CPT

## 2022-03-21 PROCEDURE — 36415 COLL VENOUS BLD VENIPUNCTURE: CPT

## 2022-03-28 ENCOUNTER — OFFICE VISIT (OUTPATIENT)
Dept: CARDIOLOGY | Facility: CLINIC | Age: 62
End: 2022-03-28

## 2022-03-28 VITALS
WEIGHT: 277 LBS | HEART RATE: 60 BPM | DIASTOLIC BLOOD PRESSURE: 85 MMHG | BODY MASS INDEX: 39.65 KG/M2 | HEIGHT: 70 IN | SYSTOLIC BLOOD PRESSURE: 136 MMHG

## 2022-03-28 DIAGNOSIS — E66.01 SEVERE OBESITY (BMI 35.0-39.9) WITH COMORBIDITY: ICD-10-CM

## 2022-03-28 DIAGNOSIS — I10 BENIGN ESSENTIAL HYPERTENSION: Primary | ICD-10-CM

## 2022-03-28 DIAGNOSIS — I48.0 PAROXYSMAL ATRIAL FIBRILLATION: Chronic | ICD-10-CM

## 2022-03-28 DIAGNOSIS — E78.5 HYPERLIPIDEMIA LDL GOAL <100: ICD-10-CM

## 2022-03-28 PROCEDURE — 99214 OFFICE O/P EST MOD 30 MIN: CPT | Performed by: INTERNAL MEDICINE

## 2022-03-28 NOTE — ASSESSMENT & PLAN NOTE
He had a bout of atrial fibrillation was in the hospital for for a colonoscopy.  He has not had any recurrence since then.  We will try a 30-day monitor MCAT to see if he is having any asymptomatic paroxysmal A. fib episodes.  If not he would like to stop his Eliquis.

## 2022-03-28 NOTE — ASSESSMENT & PLAN NOTE
His lipids are at goal, however, his SGOT is slightly elevated.  It could be from the URI and viral infection that is undergoing treatment.  We will repeat his chemistry panel in 1 month to make sure that it returns back to normal

## 2022-03-28 NOTE — PROGRESS NOTES
Office Visit    Chief Complaint  Atrial Fibrillation, Coronary Artery Disease, Hypertension, and Hyperlipidemia    Subjective            Jerman Caro presents to [facility] CARDIOLOGY  Jerman is a 61 years old gentleman with hypertension hyperlipidemia who had a bout of atrial fib during colonoscopy.  He has not had any recurrence since then.  He denies chest pain palpitation shortness of breath dizziness or syncope.  He is quite keen on stopping his Eliquis if possible.  I explained to him that we will do a 30-day monitor and if that does not show any progress A. fib then we can consider discontinuing the Eliquis      Past Medical History:   Diagnosis Date   • Atrial fibrillation (HCC)    • Colon cancer screening 03/17/2021    POSITIVE- REFERRED FOR COLONOSCOPY   • Coronary artery disease     NEW ONSET OF A-FIB CORRECTED BY CARDIOVERSION 5/2021   • Hyperlipidemia    • Hypertension    • Sleep apnea     USES CPAP       No Known Allergies     Past Surgical History:   Procedure Laterality Date   • CARDIOVERSION     • COLONOSCOPY N/A 06/15/2021    Procedure: COLONOSCOPY w/ bx;  Surgeon: Boris Isaac MD;  Location: [facility] ENDOSCOPY;  Service: General;  Laterality: N/A;  colon polyp        Social History     Tobacco Use   • Smoking status: Never Smoker   • Smokeless tobacco: Never Used   Vaping Use   • Vaping Use: Never used   Substance Use Topics   • Alcohol use: Never     Comment: VERY RARELY   • Drug use: Never       Family History   Problem Relation Age of Onset   • Hypertension Mother    • Heart disease Father    • Hypertension Father    • Hypertension Brother         Prior to Admission medications    Medication Sig Start Date End Date Taking? Authorizing Provider   amLODIPine (NORVASC) 5 MG tablet Take 5 mg by mouth Daily. 9/10/21  Yes ProviderMiah MD   apixaban (ELIQUIS) 5 MG tablet tablet TAKE 1 TABLET BY MOUTH TWO TIMES A DAY 7/7/21  Yes ProviderMiah MD   atorvastatin  "(LIPITOR) 10 MG tablet Take 1 tablet by mouth Every Night. 9/27/21  Yes Marcia Hall MD   cetirizine (zyrTEC) 10 MG tablet Take 10 mg by mouth Daily.   Yes ProviderMiah MD   losartan (COZAAR) 25 MG tablet Take 1 tablet by mouth Every Morning. 9/27/21  Yes Marcia Hall MD   metoprolol succinate XL (TOPROL-XL) 50 MG 24 hr tablet Take 1 tablet by mouth every morning and  every night. bid 9/27/21  Yes Marcia Hall MD   multivitamin with minerals tablet tablet Take 1 tablet by mouth Daily.   Yes ProviderMiah MD   sildenafil (REVATIO) 20 MG tablet TAKE 2 TABLETS BY MOUTH ONCE DAILY AS NEEDED FOR ED 9/13/21  Yes ProviderMiah MD   triamterene-hydrochlorothiazide (DYAZIDE) 37.5-25 MG per capsule Take 1 capsule by mouth Every Other Day.   Yes ProviderMiah MD   terazosin (HYTRIN) 2 MG capsule Take 1 capsule by mouth 2 (two) times a day for 90 days. 10/25/21 1/23/22  Andrew Estrada DO        Review of Systems   Constitutional: Negative for fatigue.   Respiratory: Negative for cough and shortness of breath.    Cardiovascular: Negative for chest pain, palpitations and leg swelling.   Neurological: Negative for dizziness.        Objective     /85   Pulse 60   Ht 177.8 cm (70\")   Wt 126 kg (277 lb)   BMI 39.75 kg/m²       Physical Exam  Constitutional:       General: He is awake.      Appearance: Normal appearance.   Neck:      Thyroid: No thyromegaly.      Vascular: No carotid bruit or JVD.   Cardiovascular:      Rate and Rhythm: Normal rate and regular rhythm.      Chest Wall: PMI is not displaced.      Pulses: Normal pulses.      Heart sounds: Normal heart sounds, S1 normal and S2 normal. No murmur heard.    No friction rub. No gallop. No S3 or S4 sounds.   Pulmonary:      Effort: Pulmonary effort is normal.      Breath sounds: Normal breath sounds and air entry. No wheezing, rhonchi or rales.   Abdominal:      General: Bowel sounds are normal.      Palpations: " Abdomen is soft. There is no mass.      Tenderness: There is no abdominal tenderness.   Musculoskeletal:      Cervical back: Neck supple.      Right lower leg: No edema.      Left lower leg: No edema.   Neurological:      Mental Status: He is alert and oriented to person, place, and time.   Psychiatric:         Mood and Affect: Mood normal.         Behavior: Behavior is cooperative.       No results found for: PROBNP, BNP  CMP    CMP 9/20/21 2/1/22 3/21/22   Glucose 90 98 95   BUN 18 14 16   Creatinine 0.97 1.06 1.07   eGFR Non African Am 78 71    Sodium 141 138 140   Potassium 3.9 3.9 3.7   Chloride 107 101 105   Calcium 8.6 9.2 9.2   Albumin 4.00 4.20 4.10   Total Bilirubin 0.6 0.6 0.6   Alkaline Phosphatase 72 97 109   AST (SGOT) 18 23 39   ALT (SGPT) 22 31 61 (A)   (A) Abnormal value            CBC w/diff    CBC w/Diff 5/3/21 5/18/21 9/20/21   WBC 6.12 6.02 6.05   RBC 5.43 5.74 5.08   Hemoglobin 15.0 16.0 14.2   Hematocrit 46.1 49.0 43.1   MCV 84.9 85.4 84.8   MCH 27.6 27.9 28.0   MCHC 32.5 (A) 32.7 (A) 32.9   RDW 14.3 14.0 13.2   Platelets 215 204 172   Neutrophil Rel % 72.1 73.8 70.6   Immature Granulocyte Rel %   0.3   Lymphocyte Rel % 20.6 19.8 (A) 21.0   Monocyte Rel % 6.4 5.8 7.6   Eosinophil Rel % 0.0 0.0 0.0 (A)   Basophil Rel % 0.7 0.3 0.5   (A) Abnormal value             Lipid Panel    Lipid Panel 9/20/21 2/1/22 3/21/22   Total Cholesterol 166 128 138   Triglycerides 90 91 112   HDL Cholesterol 48 47 43   VLDL Cholesterol 17 17 20   LDL Cholesterol  101 (A) 64 75   LDL/HDL Ratio 2.08 1.34 1.69   (A) Abnormal value             Lab Results   Component Value Date    TSH 2.470 06/17/2020    TSH 1.290 11/06/2019    TSH 1.130 03/13/2019      No results found for: FREET4   No results found for: DDIMERQUANT  Magnesium   Date Value Ref Range Status   03/21/2022 2.2 1.6 - 2.4 mg/dL Final      No results found for: DIGOXIN              Result Review :                           Assessment and Plan        Diagnoses  and all orders for this visit:    1. Benign essential hypertension (Primary)  Assessment & Plan:  His blood pressure is controlled based on his current log that he has brought with him    Orders:  -     Mobile Cardiac Outpatient Telemetry; Future  -     CBC & Differential; Future  -     Comprehensive Metabolic Panel; Future  -     T4, Free; Future  -     TSH; Future  -     Lipid Panel; Future  -     Comprehensive Metabolic Panel; Future  -     Magnesium; Future    2. Hyperlipidemia LDL goal <100  Assessment & Plan:  His lipids are at goal, however, his SGOT is slightly elevated.  It could be from the URI and viral infection that is undergoing treatment.  We will repeat his chemistry panel in 1 month to make sure that it returns back to normal    Orders:  -     Mobile Cardiac Outpatient Telemetry; Future  -     CBC & Differential; Future  -     Comprehensive Metabolic Panel; Future  -     T4, Free; Future  -     TSH; Future  -     Lipid Panel; Future  -     Comprehensive Metabolic Panel; Future  -     Magnesium; Future    3. Paroxysmal atrial fibrillation (HCC)  Assessment & Plan:  He had a bout of atrial fibrillation was in the hospital for for a colonoscopy.  He has not had any recurrence since then.  We will try a 30-day monitor MCAT to see if he is having any asymptomatic paroxysmal A. fib episodes.  If not he would like to stop his Eliquis.    Orders:  -     Mobile Cardiac Outpatient Telemetry; Future  -     CBC & Differential; Future  -     Comprehensive Metabolic Panel; Future  -     T4, Free; Future  -     TSH; Future  -     Lipid Panel; Future  -     Comprehensive Metabolic Panel; Future  -     Magnesium; Future    4. Severe obesity (BMI 35.0-39.9) with comorbidity (HCC)          Follow Up     Return in about 6 months (around 9/28/2022) for EKG with next office visit, next ov with June.    Patient was given instructions and counseling regarding his condition or for health maintenance advice. Please see  specific information pulled into the AVS if appropriate.     Marcia Hall MD  03/28/22 10:17 EDT

## 2022-04-01 ENCOUNTER — DOCUMENTATION (OUTPATIENT)
Dept: CARDIOLOGY | Facility: CLINIC | Age: 62
End: 2022-04-01

## 2022-04-25 ENCOUNTER — OFFICE VISIT (OUTPATIENT)
Dept: FAMILY MEDICINE CLINIC | Facility: CLINIC | Age: 62
End: 2022-04-25

## 2022-04-25 VITALS
BODY MASS INDEX: 39.37 KG/M2 | HEART RATE: 84 BPM | OXYGEN SATURATION: 99 % | WEIGHT: 275 LBS | SYSTOLIC BLOOD PRESSURE: 137 MMHG | DIASTOLIC BLOOD PRESSURE: 81 MMHG | HEIGHT: 70 IN

## 2022-04-25 DIAGNOSIS — E78.5 HYPERLIPIDEMIA LDL GOAL <100: ICD-10-CM

## 2022-04-25 DIAGNOSIS — I10 BENIGN ESSENTIAL HYPERTENSION: ICD-10-CM

## 2022-04-25 DIAGNOSIS — I25.10 CORONARY ARTERY DISEASE INVOLVING NATIVE CORONARY ARTERY OF NATIVE HEART WITHOUT ANGINA PECTORIS: ICD-10-CM

## 2022-04-25 DIAGNOSIS — E66.01 SEVERE OBESITY (BMI 35.0-39.9) WITH COMORBIDITY: ICD-10-CM

## 2022-04-25 DIAGNOSIS — I48.0 PAROXYSMAL ATRIAL FIBRILLATION: Primary | Chronic | ICD-10-CM

## 2022-04-25 PROCEDURE — 99214 OFFICE O/P EST MOD 30 MIN: CPT | Performed by: FAMILY MEDICINE

## 2022-04-25 RX ORDER — TRIAMTERENE AND HYDROCHLOROTHIAZIDE 37.5; 25 MG/1; MG/1
1 TABLET ORAL DAILY
COMMUNITY
Start: 2022-02-22 | End: 2022-06-06

## 2022-04-25 RX ORDER — SILDENAFIL CITRATE 20 MG/1
TABLET ORAL
Qty: 30 TABLET | Refills: 5 | Status: SHIPPED | OUTPATIENT
Start: 2022-04-25

## 2022-04-25 NOTE — ASSESSMENT & PLAN NOTE
His blood pressure is good here as well as outside the office.  We will continue his current medication

## 2022-04-25 NOTE — PROGRESS NOTES
Chief Complaint   Patient presents with   • Follow-up     6 month    • Hyperlipidemia        Subjective     Jerman Caro  has a past medical history of Atrial fibrillation (HCC), Colon cancer screening (03/17/2021), and Sleep apnea.     Atrial fibrillation-he has been doing well since his ablation.  He is currently wearing a Holter monitor for cardiology.  He denies any palpitations or tachycardia and is currently on Eliquis.    Hypertension- he checks his blood pressure at home on a regular basis.  Typically in the 120s over 70s.    Hyperlipidemia- he takes his Lipitor on a nightly basis.    PHQ-2 Depression Screening  Little interest or pleasure in doing things?     Feeling down, depressed, or hopeless?     PHQ-2 Total Score     PHQ-9 Depression Screening  Little interest or pleasure in doing things?     Feeling down, depressed, or hopeless?     Trouble falling or staying asleep, or sleeping too much?     Feeling tired or having little energy?     Poor appetite or overeating?     Feeling bad about yourself - or that you are a failure or have let yourself or your family down?     Trouble concentrating on things, such as reading the newspaper or watching television?     Moving or speaking so slowly that other people could have noticed? Or the opposite - being so fidgety or restless that you have been moving around a lot more than usual?     Thoughts that you would be better off dead, or of hurting yourself in some way?     PHQ-9 Total Score     If you checked off any problems, how difficult have these problems made it for you to do your work, take care of things at home, or get along with other people?       No Known Allergies    Prior to Admission medications    Medication Sig Start Date End Date Taking? Authorizing Provider   amLODIPine (NORVASC) 5 MG tablet Take 5 mg by mouth Daily. 9/10/21  Yes Provider, MD Miah   apixaban (ELIQUIS) 5 MG tablet tablet TAKE 1 TABLET BY MOUTH TWO TIMES A DAY 7/7/21  Yes  Provider, MD Miah   atorvastatin (LIPITOR) 10 MG tablet Take 1 tablet by mouth Every Night. 9/27/21  Yes Marcia Hall MD   cetirizine (zyrTEC) 10 MG tablet Take 10 mg by mouth Daily.   Yes ProviderMiah MD   losartan (COZAAR) 25 MG tablet Take 1 tablet by mouth Every Morning. 9/27/21  Yes Marcia Hall MD   metoprolol succinate XL (TOPROL-XL) 50 MG 24 hr tablet Take 1 tablet by mouth every morning and  every night. bid 9/27/21  Yes Marcia Hall MD   multivitamin with minerals tablet tablet Take 1 tablet by mouth Daily.   Yes ProviderMiah MD   sildenafil (REVATIO) 20 MG tablet TAKE 2 TABLETS BY MOUTH ONCE DAILY AS NEEDED FOR ED 9/13/21  Yes Miah Nguyen MD   triamterene-hydrochlorothiazide (DYAZIDE) 37.5-25 MG per capsule Take 1 capsule by mouth Every Other Day.   Yes Miah Nguyen MD   triamterene-hydrochlorothiazide (MAXZIDE-25) 37.5-25 MG per tablet Take 1 tablet by mouth Daily. for blood pressure. 2/22/22  Yes Miah Nguyen MD   terazosin (HYTRIN) 2 MG capsule Take 1 capsule by mouth 2 (two) times a day for 90 days. 10/25/21 1/23/22  Andrew Estrada,         Patient Active Problem List   Diagnosis   • Positive colorectal cancer screening using Cologuard test   • Atrial fibrillation (HCC)   • Benign essential hypertension   • Hyperlipidemia LDL goal <100   • Colon cancer screening   • Allergic rhinitis   • Benign prostatic hyperplasia with weak urinary stream   • Need for influenza vaccination   • Severe obesity (BMI 35.0-39.9) with comorbidity (HCC)        Past Surgical History:   Procedure Laterality Date   • CARDIOVERSION     • COLONOSCOPY N/A 06/15/2021    Procedure: COLONOSCOPY w/ bx;  Surgeon: Boris Isaac MD;  Location: [facility] ENDOSCOPY;  Service: General;  Laterality: N/A;  colon polyp       Social History     Socioeconomic History   • Marital status:    Tobacco Use   • Smoking status: Never Smoker   • Smokeless tobacco:  "Never Used   Vaping Use   • Vaping Use: Never used   Substance and Sexual Activity   • Alcohol use: Yes     Comment: VERY RARELY   • Drug use: Never   • Sexual activity: Defer       Family History   Problem Relation Age of Onset   • Hypertension Mother    • Heart disease Father    • Hypertension Father    • Hypertension Brother        Family history, surgical history, past medical history, Allergies and med's reviewed with patient today and updated in [facility] EMR.     ROS:  Review of Systems   Constitutional: Negative for fatigue.   HENT: Negative for congestion, nosebleeds, postnasal drip and rhinorrhea.    Eyes: Negative for blurred vision and visual disturbance.   Respiratory: Negative for cough, chest tightness, shortness of breath and wheezing.    Cardiovascular: Negative for chest pain and palpitations.   Gastrointestinal: Negative for abdominal pain, blood in stool, constipation and diarrhea.   Genitourinary: Negative for hematuria.   Neurological: Negative for headache.   Psychiatric/Behavioral: Negative for depressed mood. The patient is not nervous/anxious.        OBJECTIVE:  Vitals:    04/25/22 0753   BP: 137/81   BP Location: Left arm   Patient Position: Sitting   Cuff Size: Adult   Pulse: 84   SpO2: 99%   Weight: 125 kg (275 lb)   Height: 177.8 cm (70\")     No exam data present   Body mass index is 39.46 kg/m².  No LMP for male patient.    Physical Exam  Vitals and nursing note reviewed.   Constitutional:       General: He is not in acute distress.     Appearance: Normal appearance. He is obese.   HENT:      Head: Normocephalic.      Right Ear: Tympanic membrane, ear canal and external ear normal.      Left Ear: Tympanic membrane, ear canal and external ear normal.      Nose: Nose normal.      Mouth/Throat:      Mouth: Mucous membranes are moist.      Pharynx: Oropharynx is clear.   Eyes:      General: No scleral icterus.     Conjunctiva/sclera: Conjunctivae normal.      Pupils: Pupils are equal, round, " and reactive to light.   Cardiovascular:      Rate and Rhythm: Normal rate and regular rhythm.      Pulses: Normal pulses.      Heart sounds: Normal heart sounds. No murmur heard.  Pulmonary:      Effort: Pulmonary effort is normal.      Breath sounds: Normal breath sounds. No wheezing, rhonchi or rales.   Musculoskeletal:      Cervical back: No rigidity or tenderness.   Lymphadenopathy:      Cervical: No cervical adenopathy.   Skin:     General: Skin is warm and dry.      Coloration: Skin is not jaundiced.      Findings: No rash.   Neurological:      General: No focal deficit present.      Mental Status: He is alert and oriented to person, place, and time.   Psychiatric:         Mood and Affect: Mood normal.         Thought Content: Thought content normal.         Judgment: Judgment normal.         Procedures    Ancillary Procedure on 04/12/2022   Component Date Value Ref Range Status   • Target HR (85%) 04/12/2022 133  bpm In process   • Max. Pred. HR (100%) 04/12/2022 157  bpm In process       ASSESSMENT/ PLAN:    Diagnoses and all orders for this visit:    1. Paroxysmal atrial fibrillation (HCC) (Primary)    2. Benign essential hypertension    3. Hyperlipidemia LDL goal <100    4. Coronary artery disease involving native coronary artery of native heart without angina pectoris    5. Severe obesity (BMI 35.0-39.9) with comorbidity (HCC)        Orders Placed Today:     No orders of the defined types were placed in this encounter.       Management Plan:     An After Visit Summary was printed and given to the patient at discharge.    Follow-up: No follow-ups on file.    Andrew Estrada DO 4/25/2022 08:07 EDT  This note was electronically signed.

## 2022-04-25 NOTE — ASSESSMENT & PLAN NOTE
Clinically he is in a normal sinus rhythm today.  He is wearing his Holter monitor.  Hopefully will not show any episodes of atrial fibrillation he will be able to come off his Eliquis.

## 2022-04-25 NOTE — PROGRESS NOTES
Chief Complaint  Follow-up (6 month ) and Hyperlipidemia    Subjective     {Problem List  Visit Diagnosis   Encounters  Notes  Medications  Labs  Result Review Imaging  Media :23}     Jerman Caro is a 61 y.o. male who presents to [facility]    History of Present Illness        PHQ-2 Total Score:     PHQ-9 Total Score:         Review of Systems       Medical History: has a past medical history of Atrial fibrillation (HCC), Colon cancer screening (03/17/2021), Coronary artery disease, Hyperlipidemia, Hypertension, and Sleep apnea.     Surgical History: has a past surgical history that includes Colonoscopy (N/A, 06/15/2021) and Cardioversion.     Family History: family history includes Heart disease in his father; Hypertension in his brother, father, and mother.     Social History: reports that he has never smoked. He has never used smokeless tobacco. He reports that he does not drink alcohol and does not use drugs.    Allergies: Patient has no known allergies.      Health Maintenance Due   Topic Date Due   • ANNUAL PHYSICAL  Never done   • ZOSTER VACCINE (1 of 2) Never done   • HEPATITIS C SCREENING  Never done            Current Outpatient Medications:   •  amLODIPine (NORVASC) 5 MG tablet, Take 5 mg by mouth Daily., Disp: , Rfl:   •  apixaban (ELIQUIS) 5 MG tablet tablet, TAKE 1 TABLET BY MOUTH TWO TIMES A DAY, Disp: , Rfl:   •  atorvastatin (LIPITOR) 10 MG tablet, Take 1 tablet by mouth Every Night., Disp: 90 tablet, Rfl: 3  •  cetirizine (zyrTEC) 10 MG tablet, Take 10 mg by mouth Daily., Disp: , Rfl:   •  losartan (COZAAR) 25 MG tablet, Take 1 tablet by mouth Every Morning., Disp: 90 tablet, Rfl: 3  •  metoprolol succinate XL (TOPROL-XL) 50 MG 24 hr tablet, Take 1 tablet by mouth every morning and  every night. bid, Disp: 180 tablet, Rfl: 3  •  multivitamin with minerals tablet tablet, Take 1 tablet by mouth Daily., Disp: , Rfl:   •  sildenafil (REVATIO) 20 MG tablet,  TAKE 2 TABLETS BY MOUTH ONCE DAILY AS NEEDED FOR ED, Disp: , Rfl:   •  terazosin (HYTRIN) 2 MG capsule, Take 1 capsule by mouth 2 (two) times a day for 90 days., Disp: 180 capsule, Rfl: 3  •  triamterene-hydrochlorothiazide (DYAZIDE) 37.5-25 MG per capsule, Take 1 capsule by mouth Every Other Day., Disp: , Rfl:   •  triamterene-hydrochlorothiazide (MAXZIDE-25) 37.5-25 MG per tablet, Take 1 tablet by mouth Daily. for blood pressure., Disp: , Rfl:       Immunization History   Administered Date(s) Administered   • COVID-19 (PFIZER) PURPLE CAP 03/08/2021, 03/08/2021, 03/29/2021, 03/29/2021, 12/10/2021   • FluLaval/Fluarix/Fluzone >6 10/25/2021         Objective       There were no vitals filed for this visit.   There is no height or weight on file to calculate BMI.   Wt Readings from Last 3 Encounters:   03/28/22 126 kg (277 lb)   10/25/21 127 kg (279 lb 3.2 oz)   09/27/21 124 kg (273 lb)      BP Readings from Last 3 Encounters:   03/28/22 136/85   10/25/21 139/88   09/27/21 139/85        Physical Exam        Result Review :{Labs  Result Review  Imaging  Med Tab  Media :23}     {Ambulatory Labs:02266}    Data reviewed: ***              Assessment and Plan {CC Problem List  Visit Diagnosis  ROS  Review (Popup)  Health Maintenance  Quality  BestPractice  Medications  SmartSets  SnapShot Encounters  Media :23}       There are no diagnoses linked to this encounter.      Follow Up {Instructions Charge Capture  Follow-up Communications :23}    No follow-ups on file.    Patient was given instructions and counseling regarding his condition or for health maintenance advice. Please see specific information pulled into the AVS if appropriate.     Bessy Benitez

## 2022-04-25 NOTE — ASSESSMENT & PLAN NOTE
His weight is improving although rather slowly.  He is can work a little bit harder on that this spring and summer.

## 2022-05-02 RX ORDER — AMLODIPINE BESYLATE 5 MG/1
TABLET ORAL
Qty: 90 TABLET | Refills: 3 | Status: SHIPPED | OUTPATIENT
Start: 2022-05-02 | End: 2022-09-01 | Stop reason: SDUPTHER

## 2022-05-02 NOTE — TELEPHONE ENCOUNTER
Rx Refill Note  Requested Prescriptions     Pending Prescriptions Disp Refills   • amLODIPine (NORVASC) 5 MG tablet [Pharmacy Med Name: AMLODIPINE BESYLATE 5 MG TA 5 Tablet] 30 tablet      Sig: TAKE 1 TABLET BY MOUTH EVERY DAY      Last office visit with prescribing clinician:       Next office visit with prescribing clinician: 9/28/2022            Billie Salgado  05/02/22, 15:46 EDT      Matches last OV note.

## 2022-06-06 ENCOUNTER — OFFICE VISIT (OUTPATIENT)
Dept: FAMILY MEDICINE CLINIC | Facility: CLINIC | Age: 62
End: 2022-06-06

## 2022-06-06 VITALS
HEIGHT: 70 IN | OXYGEN SATURATION: 95 % | WEIGHT: 265 LBS | TEMPERATURE: 98.1 F | SYSTOLIC BLOOD PRESSURE: 126 MMHG | BODY MASS INDEX: 37.94 KG/M2 | DIASTOLIC BLOOD PRESSURE: 84 MMHG | HEART RATE: 134 BPM

## 2022-06-06 DIAGNOSIS — R00.0 TACHYCARDIA: Primary | ICD-10-CM

## 2022-06-06 DIAGNOSIS — M54.50 ACUTE BILATERAL LOW BACK PAIN WITHOUT SCIATICA: ICD-10-CM

## 2022-06-06 PROCEDURE — 99214 OFFICE O/P EST MOD 30 MIN: CPT | Performed by: FAMILY MEDICINE

## 2022-06-06 PROCEDURE — 93000 ELECTROCARDIOGRAM COMPLETE: CPT | Performed by: FAMILY MEDICINE

## 2022-06-06 RX ORDER — METHYLPREDNISOLONE 4 MG/1
TABLET ORAL
Qty: 21 TABLET | Refills: 0 | Status: SHIPPED | OUTPATIENT
Start: 2022-06-06 | End: 2022-07-13

## 2022-06-06 NOTE — ASSESSMENT & PLAN NOTE
His rhythm seems normal although the rate is rather fast.  We will get an EKG to make sure he is not back in atrial fibrillation.  His EKG does not show any recurrent atrial fibrillation but does show a sinus tachycardia at 117 bpm.  We will check labs to make sure these are fine.

## 2022-06-06 NOTE — PROGRESS NOTES
Chief Complaint   Patient presents with   • Back Pain   • Hypertension        Subjective     Jerman Caro  has a past medical history of Atrial fibrillation (HCC), Colon cancer screening (03/17/2021), and Sleep apnea.    Low back pain- he has had some lower back pain for about the last 3 days.  He denies any precipitating or provoking events prior to this.  He denies any reticular symptoms.  He states the pain is worse with any standing.  He has taken Tylenol but no relief.    Tachycardia- on assessment today's initial heart rate was 134.  He is unaware of any tachycardia or palpitation.  He does have a history of atrial fibrillation.  He had had a previous cardioversion.      PHQ-2 Depression Screening  Little interest or pleasure in doing things?     Feeling down, depressed, or hopeless?     PHQ-2 Total Score     PHQ-9 Depression Screening  Little interest or pleasure in doing things?     Feeling down, depressed, or hopeless?     Trouble falling or staying asleep, or sleeping too much?     Feeling tired or having little energy?     Poor appetite or overeating?     Feeling bad about yourself - or that you are a failure or have let yourself or your family down?     Trouble concentrating on things, such as reading the newspaper or watching television?     Moving or speaking so slowly that other people could have noticed? Or the opposite - being so fidgety or restless that you have been moving around a lot more than usual?     Thoughts that you would be better off dead, or of hurting yourself in some way?     PHQ-9 Total Score     If you checked off any problems, how difficult have these problems made it for you to do your work, take care of things at home, or get along with other people?       No Known Allergies    Prior to Admission medications    Medication Sig Start Date End Date Taking? Authorizing Provider   amLODIPine (NORVASC) 5 MG tablet TAKE 1 TABLET BY MOUTH EVERY DAY 5/2/22  Yes Rylie Shelley June, APRN    apixaban (ELIQUIS) 5 MG tablet tablet 5 mg Every 12 (Twelve) Hours. 7/7/21  Yes Miah Nguyen MD   atorvastatin (LIPITOR) 10 MG tablet Take 1 tablet by mouth Every Night. 9/27/21  Yes Marcia Hall MD   cetirizine (zyrTEC) 10 MG tablet Take 10 mg by mouth Daily.   Yes Miah Nguyen MD   losartan (COZAAR) 25 MG tablet Take 1 tablet by mouth Every Morning. 9/27/21  Yes Marcia Hall MD   metoprolol succinate XL (TOPROL-XL) 50 MG 24 hr tablet Take 1 tablet by mouth every morning and  every night. bid 9/27/21  Yes Marcia Hall MD   multivitamin with minerals tablet tablet Take 1 tablet by mouth Daily.   Yes Miah Nguyen MD   sildenafil (REVATIO) 20 MG tablet Take 2 tabs once daily as needed 4/25/22  Yes Andrew Estrada DO   triamterene-hydrochlorothiazide (DYAZIDE) 37.5-25 MG per capsule Take 1 capsule by mouth Every Other Day.   Yes Miah Nguyen MD   triamterene-hydrochlorothiazide (MAXZIDE-25) 37.5-25 MG per tablet Take 1 tablet by mouth Daily. for blood pressure. 2/22/22 6/6/22 Yes Miah Nguyen MD   terazosin (HYTRIN) 2 MG capsule Take 1 capsule by mouth 2 (two) times a day for 90 days. 10/25/21 1/23/22  Andrew Estrada DO        Patient Active Problem List   Diagnosis   • Positive colorectal cancer screening using Cologuard test   • Atrial fibrillation (HCC)   • Benign essential hypertension   • Hyperlipidemia LDL goal <100   • Colon cancer screening   • Allergic rhinitis   • Benign prostatic hyperplasia with weak urinary stream   • Need for influenza vaccination   • Severe obesity (BMI 35.0-39.9) with comorbidity (HCC)   • Tachycardia   • Acute bilateral low back pain without sciatica        Past Surgical History:   Procedure Laterality Date   • CARDIOVERSION     • COLONOSCOPY N/A 06/15/2021    Procedure: COLONOSCOPY w/ bx;  Surgeon: Boris Isaac MD;  Location: [facility] ENDOSCOPY;  Service: General;  Laterality: N/A;  colon polyp  "      Social History     Socioeconomic History   • Marital status:    Tobacco Use   • Smoking status: Never Smoker   • Smokeless tobacco: Never Used   Vaping Use   • Vaping Use: Never used   Substance and Sexual Activity   • Alcohol use: Yes     Comment: VERY RARELY   • Drug use: Never   • Sexual activity: Defer       Family History   Problem Relation Age of Onset   • Hypertension Mother    • Heart disease Father    • Hypertension Father    • Hypertension Brother        Family history, surgical history, past medical history, Allergies and med's reviewed with patient today and updated in [facility] EMR.     ROS:  Review of Systems   Constitutional: Negative for fatigue.   Respiratory: Negative for cough, chest tightness, shortness of breath and wheezing.    Cardiovascular: Negative for chest pain and palpitations.   Musculoskeletal: Positive for back pain.   Neurological: Negative for weakness and numbness.       OBJECTIVE:  Vitals:    06/06/22 1426   BP: 126/84   BP Location: Right arm   Patient Position: Sitting   Pulse: (!) 134   Temp: 98.1 °F (36.7 °C)   SpO2: 95%   Weight: 120 kg (265 lb)   Height: 177.8 cm (70\")     No exam data present   Body mass index is 38.02 kg/m².  No LMP for male patient.    Physical Exam  Vitals and nursing note reviewed.   Constitutional:       General: He is not in acute distress.     Appearance: Normal appearance. He is obese.   HENT:      Head: Normocephalic.   Cardiovascular:      Rate and Rhythm: Regular rhythm. Tachycardia present.      Heart sounds: Normal heart sounds. No murmur heard.  Musculoskeletal:      Lumbar back: No deformity, spasms or tenderness.   Neurological:      General: No focal deficit present.      Mental Status: He is alert and oriented to person, place, and time.      Sensory: Sensation is intact.      Motor: Motor function is intact.      Deep Tendon Reflexes:      Reflex Scores:       Patellar reflexes are 2+ on the right side and 2+ on the left side.     "   Achilles reflexes are 2+ on the right side and 2+ on the left side.  Psychiatric:         Mood and Affect: Mood normal.         Behavior: Behavior normal.         Thought Content: Thought content normal.         Judgment: Judgment normal.           ECG 12 Lead    Date/Time: 6/6/2022 3:28 PM  Performed by: Andrew Estrada DO  Authorized by: Andrew Estrada DO   Comparison: compared with previous ECG   Similar to previous ECG  Rhythm: sinus rhythm and sinus tachycardia  Rate: normal  Conduction: conduction normal  ST Segments: ST segments normal  T Waves: T waves normal  QRS axis: normal  Other: no other findings    Clinical impression: normal ECG            No visits with results within 30 Day(s) from this visit.   Latest known visit with results is:   Ancillary Procedure on 04/12/2022   Component Date Value Ref Range Status   • Target HR (85%) 04/12/2022 133  bpm Final   • Max. Pred. HR (100%) 04/12/2022 157  bpm Final   • Total Enrollment Days 04/12/2022 30   Final       ASSESSMENT/ PLAN:    Diagnoses and all orders for this visit:    1. Tachycardia (Primary)  Assessment & Plan:  His rhythm seems normal although the rate is rather fast.  We will get an EKG to make sure he is not back in atrial fibrillation.  His EKG does not show any recurrent atrial fibrillation but does show a sinus tachycardia at 117 bpm.  We will check labs to make sure these are fine.    Orders:  -     Basic Metabolic Panel  -     CBC Auto Differential  -     Magnesium  -     TSH+Free T4    2. Acute bilateral low back pain without sciatica  Assessment & Plan:  He is somewhat limited in his treatment due to being on the Eliquis.  We will get an x-ray of his lumbar spine.    Orders:  -     XR Spine Lumbar Complete 4+VW; Future    Other orders  -     methylPREDNISolone (MEDROL) 4 MG dose pack; Take as directed on package instructions.  Dispense: 21 tablet; Refill: 0  -     ECG 12 Lead      Orders Placed Today:     New  Medications Ordered This Visit   Medications   • methylPREDNISolone (MEDROL) 4 MG dose pack     Sig: Take as directed on package instructions.     Dispense:  21 tablet     Refill:  0        Management Plan:     An After Visit Summary was printed and given to the patient at discharge.    Follow-up: No follow-ups on file.    Andrew Estrada DO 6/6/2022 15:32 EDT  This note was electronically signed.

## 2022-06-06 NOTE — ASSESSMENT & PLAN NOTE
He is somewhat limited in his treatment due to being on the Eliquis.  We will get an x-ray of his lumbar spine.

## 2022-06-07 ENCOUNTER — TELEPHONE (OUTPATIENT)
Dept: FAMILY MEDICINE CLINIC | Facility: CLINIC | Age: 62
End: 2022-06-07

## 2022-06-07 ENCOUNTER — LAB (OUTPATIENT)
Dept: LAB | Facility: HOSPITAL | Age: 62
End: 2022-06-07

## 2022-06-07 ENCOUNTER — HOSPITAL ENCOUNTER (OUTPATIENT)
Dept: GENERAL RADIOLOGY | Facility: HOSPITAL | Age: 62
Discharge: HOME | End: 2022-06-07

## 2022-06-07 DIAGNOSIS — M54.50 ACUTE BILATERAL LOW BACK PAIN WITHOUT SCIATICA: ICD-10-CM

## 2022-06-07 DIAGNOSIS — I10 BENIGN ESSENTIAL HYPERTENSION: ICD-10-CM

## 2022-06-07 DIAGNOSIS — I48.0 PAROXYSMAL ATRIAL FIBRILLATION: ICD-10-CM

## 2022-06-07 DIAGNOSIS — E78.5 HYPERLIPIDEMIA LDL GOAL <100: ICD-10-CM

## 2022-06-07 LAB
ALBUMIN SERPL-MCNC: 4 G/DL (ref 3.5–5.2)
ALBUMIN/GLOB SERPL: 1.1 G/DL
ALP SERPL-CCNC: 140 U/L (ref 39–117)
ALT SERPL W P-5'-P-CCNC: 52 U/L (ref 1–41)
ANION GAP SERPL CALCULATED.3IONS-SCNC: 15.3 MMOL/L (ref 5–15)
AST SERPL-CCNC: 32 U/L (ref 1–40)
BASOPHILS # BLD AUTO: 0.03 10*3/MM3 (ref 0–0.2)
BASOPHILS NFR BLD AUTO: 0.3 % (ref 0–1.5)
BILIRUB SERPL-MCNC: 0.9 MG/DL (ref 0–1.2)
BUN SERPL-MCNC: 13 MG/DL (ref 8–23)
BUN/CREAT SERPL: 11.9 (ref 7–25)
CALCIUM SPEC-SCNC: 8.9 MG/DL (ref 8.6–10.5)
CHLORIDE SERPL-SCNC: 99 MMOL/L (ref 98–107)
CO2 SERPL-SCNC: 22.7 MMOL/L (ref 22–29)
CREAT SERPL-MCNC: 1.09 MG/DL (ref 0.76–1.27)
DEPRECATED RDW RBC AUTO: 38.2 FL (ref 37–54)
EGFRCR SERPLBLD CKD-EPI 2021: 75.8 ML/MIN/1.73
EOSINOPHIL # BLD AUTO: 0.01 10*3/MM3 (ref 0–0.4)
EOSINOPHIL NFR BLD AUTO: 0.1 % (ref 0.3–6.2)
ERYTHROCYTE [DISTWIDTH] IN BLOOD BY AUTOMATED COUNT: 12.9 % (ref 12.3–15.4)
GLOBULIN UR ELPH-MCNC: 3.6 GM/DL
GLUCOSE SERPL-MCNC: 112 MG/DL (ref 65–99)
HCT VFR BLD AUTO: 43 % (ref 37.5–51)
HGB BLD-MCNC: 14.2 G/DL (ref 13–17.7)
IMM GRANULOCYTES # BLD AUTO: 0.02 10*3/MM3 (ref 0–0.05)
IMM GRANULOCYTES NFR BLD AUTO: 0.2 % (ref 0–0.5)
LYMPHOCYTES # BLD AUTO: 0.86 10*3/MM3 (ref 0.7–3.1)
LYMPHOCYTES NFR BLD AUTO: 8.9 % (ref 19.6–45.3)
MAGNESIUM SERPL-MCNC: 2 MG/DL (ref 1.6–2.4)
MCH RBC QN AUTO: 27.2 PG (ref 26.6–33)
MCHC RBC AUTO-ENTMCNC: 33 G/DL (ref 31.5–35.7)
MCV RBC AUTO: 82.2 FL (ref 79–97)
MONOCYTES # BLD AUTO: 0.95 10*3/MM3 (ref 0.1–0.9)
MONOCYTES NFR BLD AUTO: 9.9 % (ref 5–12)
NEUTROPHILS NFR BLD AUTO: 7.74 10*3/MM3 (ref 1.7–7)
NEUTROPHILS NFR BLD AUTO: 80.6 % (ref 42.7–76)
NRBC BLD AUTO-RTO: 0 /100 WBC (ref 0–0.2)
PLATELET # BLD AUTO: 259 10*3/MM3 (ref 140–450)
PMV BLD AUTO: 10.2 FL (ref 6–12)
POTASSIUM SERPL-SCNC: 3.7 MMOL/L (ref 3.5–5.2)
PROT SERPL-MCNC: 7.6 G/DL (ref 6–8.5)
RBC # BLD AUTO: 5.23 10*6/MM3 (ref 4.14–5.8)
SODIUM SERPL-SCNC: 137 MMOL/L (ref 136–145)
T4 FREE SERPL-MCNC: 1.05 NG/DL (ref 0.93–1.7)
TSH SERPL DL<=0.05 MIU/L-ACNC: 1.33 UIU/ML (ref 0.27–4.2)
WBC NRBC COR # BLD: 9.61 10*3/MM3 (ref 3.4–10.8)

## 2022-06-07 PROCEDURE — 83735 ASSAY OF MAGNESIUM: CPT | Performed by: FAMILY MEDICINE

## 2022-06-07 PROCEDURE — 84439 ASSAY OF FREE THYROXINE: CPT | Performed by: FAMILY MEDICINE

## 2022-06-07 PROCEDURE — 80050 GENERAL HEALTH PANEL: CPT

## 2022-06-07 PROCEDURE — 72110 X-RAY EXAM L-2 SPINE 4/>VWS: CPT

## 2022-06-07 PROCEDURE — 36415 COLL VENOUS BLD VENIPUNCTURE: CPT

## 2022-06-07 NOTE — TELEPHONE ENCOUNTER
As we discussed yesterday due to his Eliquis he cannot take anti-inflammatories.  Typically do not treat low back pain with narcotics.  Less than his only other option with Tylenol plus the steroids I prescribed for him the other option would be to add some physical therapy if he would like.

## 2022-06-07 NOTE — TELEPHONE ENCOUNTER
----- Message from Andrew Estrada, DO sent at 6/7/2022 12:38 PM EDT -----  His x-rays just shows some degenerative changes of his lumbar spine.

## 2022-06-07 NOTE — TELEPHONE ENCOUNTER
Patient aware of results.     He states that he was seen in the office yesterday. He is wanting to know what the solution is for controlling his pain. States he is on Eliquis, so he can not take NSAIDS. He states there was no plan made yesterday and wants to know what the best option would be.

## 2022-06-10 ENCOUNTER — LAB (OUTPATIENT)
Dept: LAB | Facility: HOSPITAL | Age: 62
End: 2022-06-10

## 2022-06-10 ENCOUNTER — TELEPHONE (OUTPATIENT)
Dept: FAMILY MEDICINE CLINIC | Facility: CLINIC | Age: 62
End: 2022-06-10

## 2022-06-10 DIAGNOSIS — R73.09 ELEVATED GLUCOSE: Primary | ICD-10-CM

## 2022-06-10 DIAGNOSIS — R73.09 ELEVATED GLUCOSE: ICD-10-CM

## 2022-06-10 LAB — HBA1C MFR BLD: 5.2 % (ref 4.8–5.6)

## 2022-06-10 PROCEDURE — 36415 COLL VENOUS BLD VENIPUNCTURE: CPT

## 2022-06-10 PROCEDURE — 83036 HEMOGLOBIN GLYCOSYLATED A1C: CPT

## 2022-06-10 RX ORDER — TRAMADOL HYDROCHLORIDE 50 MG/1
50 TABLET ORAL EVERY 6 HOURS PRN
Qty: 60 TABLET | Refills: 0 | Status: SHIPPED | OUTPATIENT
Start: 2022-06-10 | End: 2022-09-01

## 2022-06-10 NOTE — TELEPHONE ENCOUNTER
----- Message from Andrew Estrada, DO sent at 6/7/2022  4:55 PM EDT -----  His labs are normal except for a mildly elevated liver function test and elevated glucose.  As result I would get an A1c on him with his elevated glucose.  Would also repeat his liver function test in 1 month.  In the meantime he should avoid alcohol and any Tylenol or Tylenol-containing products.

## 2022-06-10 NOTE — TELEPHONE ENCOUNTER
Patient aware and states understanding.   Placed an order for A1C, patient states he is going to try and get that done today.     Patient also states that he was taking tylenol before his last appointment, states that he was having back pain that comes and goes. Patient wants to know what he can take for the pain since he is suppose to avoid tylenol and also NSAIDS due to being on eliquis.

## 2022-06-10 NOTE — TELEPHONE ENCOUNTER
I sent in a prescription for some tramadol.  This is still a controlled substance.  It does not have Tylenol and is not an anti-inflammatory.  Although it still has a potentially habit-forming and impairing thus he should be cautious when using.

## 2022-06-14 RX ORDER — APIXABAN 5 MG/1
TABLET, FILM COATED ORAL
Qty: 180 TABLET | Refills: 0 | Status: SHIPPED | OUTPATIENT
Start: 2022-06-14 | End: 2022-08-22

## 2022-07-12 RX ORDER — TRIAMTERENE AND HYDROCHLOROTHIAZIDE 37.5; 25 MG/1; MG/1
TABLET ORAL
Qty: 90 TABLET | Refills: 0 | Status: SHIPPED | OUTPATIENT
Start: 2022-07-12 | End: 2023-01-11

## 2022-07-13 ENCOUNTER — OFFICE VISIT (OUTPATIENT)
Dept: FAMILY MEDICINE CLINIC | Facility: CLINIC | Age: 62
End: 2022-07-13

## 2022-07-13 VITALS
TEMPERATURE: 98.3 F | WEIGHT: 262.8 LBS | HEIGHT: 70 IN | HEART RATE: 60 BPM | BODY MASS INDEX: 37.62 KG/M2 | SYSTOLIC BLOOD PRESSURE: 123 MMHG | OXYGEN SATURATION: 98 % | DIASTOLIC BLOOD PRESSURE: 78 MMHG

## 2022-07-13 DIAGNOSIS — M54.50 ACUTE BILATERAL LOW BACK PAIN WITHOUT SCIATICA: ICD-10-CM

## 2022-07-13 DIAGNOSIS — I48.0 PAROXYSMAL ATRIAL FIBRILLATION: Primary | Chronic | ICD-10-CM

## 2022-07-13 DIAGNOSIS — R79.89 ELEVATED LIVER FUNCTION TESTS: ICD-10-CM

## 2022-07-13 PROCEDURE — 99213 OFFICE O/P EST LOW 20 MIN: CPT | Performed by: FAMILY MEDICINE

## 2022-07-13 NOTE — ASSESSMENT & PLAN NOTE
He has not had any more episodes of tachycardia.  Today he sounds regular in an apparent normal sinus rhythm.  He remains rate controlled and anticoagulated.

## 2022-07-13 NOTE — ASSESSMENT & PLAN NOTE
His liver function tests were mildly elevated.  He is not a drinker and does not use excessive Tylenol or Tylenol-containing products.  This is most likely to a fatty liver.  We will go ahead and repeat his liver function test.

## 2022-07-13 NOTE — PROGRESS NOTES
Chief Complaint   Patient presents with   • Follow-up     Pt here for follow pt also has issues he would like to discuss        Subjective     Jerman Caro  has a past medical history of Atrial fibrillation (HCC), Colon cancer screening (03/17/2021), and Sleep apnea.    Low back pain- he states his low back pain is dramatically improved.  His x-rays showed some mild degenerative changes.  He has been seeing the chiropractor on a regular basis and that is helped.    Elevated liver enzymes- his lab tests last time showed a mildly elevated liver enzyme.  He was only 1 of 2.  He is not a drinker.  He does use Tylenol but not even on a regular basis.    Atrial fibrillation- he states since her last visit that his tachycardia has since resolved and has not noticed any further.  He states now he his heart has been regular rate controlled and he takes his Eliquis on a regular basis.      PHQ-2 Depression Screening  Little interest or pleasure in doing things?     Feeling down, depressed, or hopeless?     PHQ-2 Total Score     PHQ-9 Depression Screening  Little interest or pleasure in doing things?     Feeling down, depressed, or hopeless?     Trouble falling or staying asleep, or sleeping too much?     Feeling tired or having little energy?     Poor appetite or overeating?     Feeling bad about yourself - or that you are a failure or have let yourself or your family down?     Trouble concentrating on things, such as reading the newspaper or watching television?     Moving or speaking so slowly that other people could have noticed? Or the opposite - being so fidgety or restless that you have been moving around a lot more than usual?     Thoughts that you would be better off dead, or of hurting yourself in some way?     PHQ-9 Total Score     If you checked off any problems, how difficult have these problems made it for you to do your work, take care of things at home, or get along with other people?       No Known  Allergies    Prior to Admission medications    Medication Sig Start Date End Date Taking? Authorizing Provider   amLODIPine (NORVASC) 5 MG tablet TAKE 1 TABLET BY MOUTH EVERY DAY 5/2/22  Yes Rylie Shelley ValentinaOSCAR   atorvastatin (LIPITOR) 10 MG tablet Take 1 tablet by mouth Every Night. 9/27/21  Yes Marcia Hall MD   cetirizine (zyrTEC) 10 MG tablet Take 10 mg by mouth Daily.   Yes ProviderMiah MD   Eliquis 5 MG tablet tablet TAKE 1 TABLET BY MOUTH TWO TIMES A DAY 6/14/22  Yes Marcia Hall MD   losartan (COZAAR) 25 MG tablet Take 1 tablet by mouth Every Morning. 9/27/21  Yes Marcia Hall MD   metoprolol succinate XL (TOPROL-XL) 50 MG 24 hr tablet Take 1 tablet by mouth every morning and  every night. bid 9/27/21  Yes Marcia Hall MD   multivitamin with minerals tablet tablet Take 1 tablet by mouth Daily.   Yes ProviderMiah MD   sildenafil (REVATIO) 20 MG tablet Take 2 tabs once daily as needed 4/25/22  Yes Andrew Estrada DO   traMADol (ULTRAM) 50 MG tablet Take 1 tablet by mouth Every 6 (Six) Hours As Needed for Moderate Pain . 6/10/22  Yes Andrew Estrada DO   triamterene-hydrochlorothiazide (MAXZIDE-25) 37.5-25 MG per tablet TAKE 1 TABLET BY MOUTH EVERY DAY FOR BLOOD PRESSURE 7/12/22  Yes Marcia Hall MD   methylPREDNISolone (MEDROL) 4 MG dose pack Take as directed on package instructions. 6/6/22   Andrew Estrada DO   terazosin (HYTRIN) 2 MG capsule Take 1 capsule by mouth 2 (two) times a day for 90 days. 10/25/21 1/23/22  Andrew Estrada DO        Patient Active Problem List   Diagnosis   • Positive colorectal cancer screening using Cologuard test   • Atrial fibrillation (HCC)   • Benign essential hypertension   • Hyperlipidemia LDL goal <100   • Colon cancer screening   • Allergic rhinitis   • Benign prostatic hyperplasia with weak urinary stream   • Need for influenza vaccination   • Severe obesity (BMI 35.0-39.9) with comorbidity (HCC)   •  "Tachycardia   • Acute bilateral low back pain without sciatica   • Elevated liver function tests        Past Surgical History:   Procedure Laterality Date   • CARDIOVERSION     • COLONOSCOPY N/A 06/15/2021    Procedure: COLONOSCOPY w/ bx;  Surgeon: Boris Isaac MD;  Location: [facility] ENDOSCOPY;  Service: General;  Laterality: N/A;  colon polyp       Social History     Socioeconomic History   • Marital status:    Tobacco Use   • Smoking status: Never Smoker   • Smokeless tobacco: Never Used   Vaping Use   • Vaping Use: Never used   Substance and Sexual Activity   • Alcohol use: Yes     Comment: VERY RARELY   • Drug use: Never   • Sexual activity: Defer       Family History   Problem Relation Age of Onset   • Hypertension Mother    • Heart disease Father    • Hypertension Father    • Hypertension Brother        Family history, surgical history, past medical history, Allergies and meds reviewed with patient today and updated in [facility] EMR.     ROS:  Review of Systems   Constitutional: Negative for fatigue.   Respiratory: Negative for cough, chest tightness, shortness of breath and wheezing.    Cardiovascular: Negative for chest pain and palpitations.   Musculoskeletal: Positive for back pain.       OBJECTIVE:  Vitals:    07/13/22 1050   BP: 123/78   BP Location: Left arm   Patient Position: Sitting   Cuff Size: Large Adult   Pulse: 60   Temp: 98.3 °F (36.8 °C)   TempSrc: Temporal   SpO2: 98%   Weight: 119 kg (262 lb 12.8 oz)   Height: 177.8 cm (70\")     No exam data present   Body mass index is 37.71 kg/m².  No LMP for male patient.    Physical Exam  Vitals and nursing note reviewed.   Constitutional:       General: He is not in acute distress.     Appearance: Normal appearance. He is obese.   HENT:      Head: Normocephalic.   Cardiovascular:      Rate and Rhythm: Normal rate and regular rhythm.      Heart sounds: Normal heart sounds. No murmur heard.  Pulmonary:      Effort: Pulmonary effort is normal. "      Breath sounds: Normal breath sounds. No wheezing, rhonchi or rales.   Musculoskeletal:      Lumbar back: No deformity, spasms or tenderness.   Neurological:      Mental Status: He is alert.         Procedures    No visits with results within 30 Day(s) from this visit.   Latest known visit with results is:   Lab on 06/10/2022   Component Date Value Ref Range Status   • Hemoglobin A1C 06/10/2022 5.20  4.80 - 5.60 % Final       ASSESSMENT/ PLAN:    Diagnoses and all orders for this visit:    1. Paroxysmal atrial fibrillation (HCC) (Primary)  Assessment & Plan:  He has not had any more episodes of tachycardia.  Today he sounds regular in an apparent normal sinus rhythm.  He remains rate controlled and anticoagulated.      2. Acute bilateral low back pain without sciatica  Assessment & Plan:  His back is much improved.  He needs to continue with routine physical activity and weight loss.      3. Elevated liver function tests  Assessment & Plan:  His liver function tests were mildly elevated.  He is not a drinker and does not use excessive Tylenol or Tylenol-containing products.  This is most likely to a fatty liver.  We will go ahead and repeat his liver function test.    Orders:  -     Hepatic Function Panel; Future      Orders Placed Today:     No orders of the defined types were placed in this encounter.       Management Plan:     An After Visit Summary was printed and given to the patient at discharge.    Follow-up: Return in about 6 months (around 1/13/2023) for Recheck.    Andrew Estrada DO 7/13/2022 11:47 EDT  This note was electronically signed.

## 2022-08-01 ENCOUNTER — LAB (OUTPATIENT)
Dept: LAB | Facility: HOSPITAL | Age: 62
End: 2022-08-01

## 2022-08-01 DIAGNOSIS — R79.89 ELEVATED LIVER FUNCTION TESTS: ICD-10-CM

## 2022-08-01 DIAGNOSIS — I48.0 PAROXYSMAL ATRIAL FIBRILLATION: Chronic | ICD-10-CM

## 2022-08-01 DIAGNOSIS — I10 BENIGN ESSENTIAL HYPERTENSION: ICD-10-CM

## 2022-08-01 DIAGNOSIS — E78.5 HYPERLIPIDEMIA LDL GOAL <100: ICD-10-CM

## 2022-08-01 LAB
ALBUMIN SERPL-MCNC: 4.2 G/DL (ref 3.5–5.2)
ALBUMIN/GLOB SERPL: 1.7 G/DL
ALP SERPL-CCNC: 103 U/L (ref 39–117)
ALT SERPL W P-5'-P-CCNC: 21 U/L (ref 1–41)
ANION GAP SERPL CALCULATED.3IONS-SCNC: 7.6 MMOL/L (ref 5–15)
AST SERPL-CCNC: 18 U/L (ref 1–40)
BILIRUB CONJ SERPL-MCNC: <0.2 MG/DL (ref 0–0.3)
BILIRUB SERPL-MCNC: 0.6 MG/DL (ref 0–1.2)
BUN SERPL-MCNC: 12 MG/DL (ref 8–23)
BUN/CREAT SERPL: 10.5 (ref 7–25)
CALCIUM SPEC-SCNC: 9.1 MG/DL (ref 8.6–10.5)
CHLORIDE SERPL-SCNC: 103 MMOL/L (ref 98–107)
CHOLEST SERPL-MCNC: 116 MG/DL (ref 0–200)
CO2 SERPL-SCNC: 28.4 MMOL/L (ref 22–29)
CREAT SERPL-MCNC: 1.14 MG/DL (ref 0.76–1.27)
EGFRCR SERPLBLD CKD-EPI 2021: 71.8 ML/MIN/1.73
GLOBULIN UR ELPH-MCNC: 2.5 GM/DL
GLUCOSE SERPL-MCNC: 97 MG/DL (ref 65–99)
HDLC SERPL-MCNC: 41 MG/DL (ref 40–60)
LDLC SERPL CALC-MCNC: 55 MG/DL (ref 0–100)
LDLC/HDLC SERPL: 1.3 {RATIO}
MAGNESIUM SERPL-MCNC: 1.9 MG/DL (ref 1.6–2.4)
POTASSIUM SERPL-SCNC: 3.8 MMOL/L (ref 3.5–5.2)
PROT SERPL-MCNC: 6.7 G/DL (ref 6–8.5)
SODIUM SERPL-SCNC: 139 MMOL/L (ref 136–145)
TRIGL SERPL-MCNC: 109 MG/DL (ref 0–150)
VLDLC SERPL-MCNC: 20 MG/DL (ref 5–40)

## 2022-08-01 PROCEDURE — 83735 ASSAY OF MAGNESIUM: CPT

## 2022-08-01 PROCEDURE — 80053 COMPREHEN METABOLIC PANEL: CPT

## 2022-08-01 PROCEDURE — 82248 BILIRUBIN DIRECT: CPT

## 2022-08-01 PROCEDURE — 36415 COLL VENOUS BLD VENIPUNCTURE: CPT

## 2022-08-01 PROCEDURE — 80061 LIPID PANEL: CPT

## 2022-08-22 RX ORDER — APIXABAN 5 MG/1
TABLET, FILM COATED ORAL
Qty: 180 TABLET | Refills: 0 | Status: SHIPPED | OUTPATIENT
Start: 2022-08-22 | End: 2022-12-09

## 2022-09-01 ENCOUNTER — OFFICE VISIT (OUTPATIENT)
Dept: CARDIOLOGY | Facility: CLINIC | Age: 62
End: 2022-09-01

## 2022-09-01 VITALS
HEART RATE: 80 BPM | WEIGHT: 259.6 LBS | HEIGHT: 70 IN | DIASTOLIC BLOOD PRESSURE: 88 MMHG | SYSTOLIC BLOOD PRESSURE: 140 MMHG | BODY MASS INDEX: 37.16 KG/M2

## 2022-09-01 DIAGNOSIS — E78.5 HYPERLIPIDEMIA LDL GOAL <100: ICD-10-CM

## 2022-09-01 DIAGNOSIS — I10 BENIGN ESSENTIAL HYPERTENSION: ICD-10-CM

## 2022-09-01 DIAGNOSIS — I48.0 PAROXYSMAL ATRIAL FIBRILLATION: Primary | Chronic | ICD-10-CM

## 2022-09-01 PROCEDURE — 93000 ELECTROCARDIOGRAM COMPLETE: CPT | Performed by: INTERNAL MEDICINE

## 2022-09-01 PROCEDURE — 99214 OFFICE O/P EST MOD 30 MIN: CPT | Performed by: INTERNAL MEDICINE

## 2022-09-01 RX ORDER — ATORVASTATIN CALCIUM 10 MG/1
10 TABLET, FILM COATED ORAL DAILY
COMMUNITY
End: 2022-09-19

## 2022-09-01 RX ORDER — AMLODIPINE BESYLATE 5 MG/1
5 TABLET ORAL DAILY
Qty: 90 TABLET | Refills: 3 | Status: SHIPPED | OUTPATIENT
Start: 2022-09-01

## 2022-09-01 NOTE — ASSESSMENT & PLAN NOTE
He does tend to have higher blood pressure in the office.  He is home blood pressure readings look excellent.  We will continue losartan amlodipine and triamterene HCTZ in the current dosage

## 2022-09-01 NOTE — PROGRESS NOTES
Office Visit    Chief Complaint  Atrial Fibrillation and Hyperlipidemia    Subjective            Jerman Caro presents to [facility] CARDIOLOGY  Mr. Rodriguez is a 62 years old gentleman with hypertension hyperlipidemia obesity who is doing much better.  He denies any chest pain palpitation shortness of breath dizziness syncope.  He has paroxysmal atrial fibrillation and during a 30-day monitor he had an episode of A. fib that he was completely asymptomatic.  It was a short run for 30 seconds but I am going to maintain him on oral anticoagulantion.      Past Medical History:   Diagnosis Date   • Atrial fibrillation (HCC)    • Colon cancer screening 03/17/2021    POSITIVE- REFERRED FOR COLONOSCOPY   • Sleep apnea     USES CPAP       No Known Allergies     Past Surgical History:   Procedure Laterality Date   • CARDIOVERSION     • COLONOSCOPY N/A 06/15/2021    Procedure: COLONOSCOPY w/ bx;  Surgeon: Boris Isaac MD;  Location: [facility];  Service: General;  Laterality: N/A;  colon polyp        Social History     Tobacco Use   • Smoking status: Never Smoker   • Smokeless tobacco: Never Used   Vaping Use   • Vaping Use: Never used   Substance Use Topics   • Alcohol use: Yes     Comment: VERY RARELY   • Drug use: Never       Family History   Problem Relation Age of Onset   • Hypertension Mother    • Heart disease Father    • Hypertension Father    • Hypertension Brother         Prior to Admission medications    Medication Sig Start Date End Date Taking? Authorizing Provider   amLODIPine (NORVASC) 5 MG tablet Take 1 tablet by mouth Daily. 9/1/22  Yes Marcia Hall MD   atorvastatin (LIPITOR) 10 MG tablet Take 10 mg by mouth Daily.   Yes ProviderMiah MD   cetirizine (zyrTEC) 10 MG tablet Take 10 mg by mouth Daily.   Yes ProviderMiah MD   Eliquis 5 MG tablet tablet TAKE 1 TABLET BY MOUTH TWO TIMES A DAY 8/22/22  Yes Marcia Hall MD   losartan (COZAAR) 25 MG tablet Take 1  "tablet by mouth Every Morning. 9/27/21  Yes Marcia Hall MD   metoprolol succinate XL (TOPROL-XL) 50 MG 24 hr tablet Take 1 tablet by mouth every morning and  every night. bid 9/27/21  Yes Marcia Hall MD   multivitamin with minerals tablet tablet Take 1 tablet by mouth Daily.   Yes Miah Nguyen MD   sildenafil (REVATIO) 20 MG tablet Take 2 tabs once daily as needed 4/25/22  Yes Andrew Estrada DO   triamterene-hydrochlorothiazide (MAXZIDE-25) 37.5-25 MG per tablet TAKE 1 TABLET BY MOUTH EVERY DAY FOR BLOOD PRESSURE 7/12/22  Yes Marcia Hall MD   amLODIPine (NORVASC) 5 MG tablet TAKE 1 TABLET BY MOUTH EVERY DAY 5/2/22 9/1/22 Yes Rylie Shelley APRN   terazosin (HYTRIN) 2 MG capsule Take 1 capsule by mouth 2 (two) times a day for 90 days. 10/25/21 1/23/22  Andrew Estrada DO   atorvastatin (LIPITOR) 10 MG tablet Take 1 tablet by mouth Every Night. 9/27/21 9/1/22  Marcia Hall MD   traMADol (ULTRAM) 50 MG tablet Take 1 tablet by mouth Every 6 (Six) Hours As Needed for Moderate Pain . 6/10/22 9/1/22  Andrew Estrada DO        Review of Systems   Respiratory: Negative for cough and shortness of breath.    Cardiovascular: Negative for chest pain, palpitations and leg swelling.        Objective     /88   Pulse 80   Ht 177.8 cm (70\")   Wt 118 kg (259 lb 9.6 oz)   BMI 37.25 kg/m²       Physical Exam  Constitutional:       General: He is awake.      Appearance: Normal appearance.   Neck:      Thyroid: No thyromegaly.      Vascular: No carotid bruit or JVD.   Cardiovascular:      Rate and Rhythm: Normal rate and regular rhythm.      Chest Wall: PMI is not displaced.      Pulses: Normal pulses.      Heart sounds: Normal heart sounds, S1 normal and S2 normal. No murmur heard.    No friction rub. No gallop. No S3 or S4 sounds.   Pulmonary:      Effort: Pulmonary effort is normal.      Breath sounds: Normal breath sounds and air entry. No wheezing, rhonchi or rales. "   Abdominal:      General: Bowel sounds are normal.      Palpations: Abdomen is soft. There is no mass.      Tenderness: There is no abdominal tenderness.   Musculoskeletal:      Cervical back: Neck supple.      Right lower leg: No edema.      Left lower leg: No edema.   Neurological:      Mental Status: He is alert and oriented to person, place, and time.   Psychiatric:         Mood and Affect: Mood normal.         Behavior: Behavior is cooperative.           Result Review :                    ECG 12 Lead    Date/Time: 9/1/2022 3:10 PM  Performed by: Marcia Hall MD  Authorized by: Marcia Hall MD   Comments: Sinus rhythm, nonspecific intraventricular conduction delay.  No change compared to previous EKG except the heart rate is much slower now than what it was on 6/6/2022                Assessment and Plan        Diagnoses and all orders for this visit:    1. Paroxysmal atrial fibrillation (HCC) (Primary)  Assessment & Plan:  Mr. Caro is a 62 years old gentleman who developed spontaneous atrial fibrillation with rapid medical response when waiting for a colonoscopy.  This was in April 2021.  Subsequently he was started on a beta-blocker and electrical cardioversion was performed and he has maintained sinus rhythm since then.  A 30-day monitor showed paroxysmal atrial fibs and he has been maintained rate control measures and apixaban.  He denies any chest pain palpitations dizziness syncope    Orders:  -     Lipid Panel; Future  -     Comprehensive Metabolic Panel; Future  -     Magnesium; Future    2. Benign essential hypertension  Assessment & Plan:  He does tend to have higher blood pressure in the office.  He is home blood pressure readings look excellent.  We will continue losartan amlodipine and triamterene HCTZ in the current dosage    Orders:  -     Lipid Panel; Future  -     Comprehensive Metabolic Panel; Future  -     Magnesium; Future    3. Hyperlipidemia LDL goal <100  Assessment & Plan:  His  lipids are at goal on atorvastatin 10 mg a day.  He will continue the same.    Orders:  -     Lipid Panel; Future  -     Comprehensive Metabolic Panel; Future  -     Magnesium; Future    Other orders  -     amLODIPine (NORVASC) 5 MG tablet; Take 1 tablet by mouth Daily.  Dispense: 90 tablet; Refill: 3          Follow Up     Return in about 9 months (around 6/1/2023) for Dr LADY hernandez fu.    Patient was given instructions and counseling regarding his condition or for health maintenance advice. Please see specific information pulled into the AVS if appropriate.     Marcia Hall MD  09/01/22 10:58 EDT

## 2022-09-01 NOTE — ASSESSMENT & PLAN NOTE
Mr. Caro is a 62 years old gentleman who developed spontaneous atrial fibrillation with rapid medical response when waiting for a colonoscopy.  This was in April 2021.  Subsequently he was started on a beta-blocker and electrical cardioversion was performed and he has maintained sinus rhythm since then.  A 30-day monitor showed paroxysmal atrial fibs and he has been maintained rate control measures and apixaban.  He denies any chest pain palpitations dizziness syncope

## 2022-09-19 RX ORDER — LOSARTAN POTASSIUM 25 MG/1
25 TABLET ORAL EVERY MORNING
Qty: 90 TABLET | Refills: 3 | Status: SHIPPED | OUTPATIENT
Start: 2022-09-19

## 2022-09-19 RX ORDER — ATORVASTATIN CALCIUM 10 MG/1
TABLET, FILM COATED ORAL
Qty: 90 TABLET | Refills: 3 | Status: SHIPPED | OUTPATIENT
Start: 2022-09-19

## 2022-09-21 ENCOUNTER — TELEPHONE (OUTPATIENT)
Dept: CARDIOLOGY | Facility: CLINIC | Age: 62
End: 2022-09-21

## 2022-09-21 NOTE — TELEPHONE ENCOUNTER
Last stress test 4/26/21:     IMPRESSION:    1.  Normal regadenoson myocardial SPECT perfusion study.    2.  No evidence of myocardial infarct or reversible ischemia.    3.  Borderline left ventricular systolic function with ejection fraction of        48% and normal wall motion. The patient is in atrial fibrillation and        hence, ejection fraction may not be completely accurate.    4.  No symptoms during regadenoson infusion.    5.  No ischemic electrocardiogram changes noted.

## 2022-09-21 NOTE — TELEPHONE ENCOUNTER
Received call, patient requesting DOT clearance    PMH: Atrial fibrillation, hyperlipidemia, HTN    Last Seen: 9/1/22    Can patient be DOT cleared?

## 2022-10-26 RX ORDER — METOPROLOL SUCCINATE 50 MG/1
TABLET, EXTENDED RELEASE ORAL
Qty: 180 TABLET | Refills: 2 | Status: SHIPPED | OUTPATIENT
Start: 2022-10-26

## 2022-10-31 ENCOUNTER — OFFICE VISIT (OUTPATIENT)
Dept: FAMILY MEDICINE CLINIC | Facility: CLINIC | Age: 62
End: 2022-10-31

## 2022-10-31 ENCOUNTER — OFFICE VISIT (OUTPATIENT)
Dept: SLEEP MEDICINE | Facility: HOSPITAL | Age: 62
End: 2022-10-31

## 2022-10-31 VITALS
HEIGHT: 70 IN | HEART RATE: 65 BPM | OXYGEN SATURATION: 97 % | SYSTOLIC BLOOD PRESSURE: 118 MMHG | DIASTOLIC BLOOD PRESSURE: 77 MMHG | BODY MASS INDEX: 37.62 KG/M2 | WEIGHT: 262.8 LBS

## 2022-10-31 VITALS
WEIGHT: 264.4 LBS | BODY MASS INDEX: 37.85 KG/M2 | RESPIRATION RATE: 19 BRPM | OXYGEN SATURATION: 98 % | HEART RATE: 62 BPM | SYSTOLIC BLOOD PRESSURE: 116 MMHG | DIASTOLIC BLOOD PRESSURE: 72 MMHG | HEIGHT: 70 IN | TEMPERATURE: 97.9 F

## 2022-10-31 DIAGNOSIS — E78.5 HYPERLIPIDEMIA LDL GOAL <100: ICD-10-CM

## 2022-10-31 DIAGNOSIS — I10 BENIGN ESSENTIAL HYPERTENSION: ICD-10-CM

## 2022-10-31 DIAGNOSIS — Z23 NEED FOR INFLUENZA VACCINATION: ICD-10-CM

## 2022-10-31 DIAGNOSIS — Z02.4 ENCOUNTER FOR CDL (COMMERCIAL DRIVING LICENSE) EXAM: ICD-10-CM

## 2022-10-31 DIAGNOSIS — I48.0 PAROXYSMAL ATRIAL FIBRILLATION: Primary | Chronic | ICD-10-CM

## 2022-10-31 DIAGNOSIS — G47.33 OSA ON CPAP: Primary | ICD-10-CM

## 2022-10-31 DIAGNOSIS — Z99.89 OSA ON CPAP: Primary | ICD-10-CM

## 2022-10-31 DIAGNOSIS — Z23 NEED FOR COVID-19 VACCINE: ICD-10-CM

## 2022-10-31 DIAGNOSIS — E66.9 CLASS 2 OBESITY: ICD-10-CM

## 2022-10-31 PROBLEM — C83.00: Status: ACTIVE | Noted: 2022-10-31

## 2022-10-31 PROBLEM — E66.812 CLASS 2 OBESITY: Status: ACTIVE | Noted: 2022-10-31

## 2022-10-31 PROCEDURE — 99214 OFFICE O/P EST MOD 30 MIN: CPT | Performed by: FAMILY MEDICINE

## 2022-10-31 PROCEDURE — 99214 OFFICE O/P EST MOD 30 MIN: CPT | Performed by: INTERNAL MEDICINE

## 2022-10-31 PROCEDURE — 91312 COVID-19 (PFIZER) BIVALENT BOOSTER 12+YRS: CPT | Performed by: FAMILY MEDICINE

## 2022-10-31 PROCEDURE — 0124A COVID-19 (PFIZER) BIVALENT BOOSTER 12+YRS: CPT | Performed by: FAMILY MEDICINE

## 2022-10-31 PROCEDURE — G0463 HOSPITAL OUTPT CLINIC VISIT: HCPCS

## 2022-10-31 NOTE — PROGRESS NOTES
Chief Complaint   Patient presents with   • Follow-up     6 mo f/u   • Hypertension        Subjective     Jerman Caro  has a past medical history of Atrial fibrillation (HCC), Colon cancer screening (03/17/2021), and Sleep apnea.    Atrial fibrillation- he is doing well.  He denies any palpitations or tachycardia.  He takes his Eliquis twice daily every day.  He denies any frequent nosebleeds, gross hematuria, or bloody bowel movements.    Hypertension- he does check his blood pressure at home.  He states normally it is good.  Typically per his blood pressure log gets 120s over 70s.    Hyperlipidemia- taking his atorvastatin every night.  He had a lipid profile about 3 months ago that was within normal limits.      PHQ-2 Depression Screening  Little interest or pleasure in doing things?     Feeling down, depressed, or hopeless?     PHQ-2 Total Score     PHQ-9 Depression Screening  Little interest or pleasure in doing things?     Feeling down, depressed, or hopeless?     Trouble falling or staying asleep, or sleeping too much?     Feeling tired or having little energy?     Poor appetite or overeating?     Feeling bad about yourself - or that you are a failure or have let yourself or your family down?     Trouble concentrating on things, such as reading the newspaper or watching television?     Moving or speaking so slowly that other people could have noticed? Or the opposite - being so fidgety or restless that you have been moving around a lot more than usual?     Thoughts that you would be better off dead, or of hurting yourself in some way?     PHQ-9 Total Score     If you checked off any problems, how difficult have these problems made it for you to do your work, take care of things at home, or get along with other people?       No Known Allergies    Prior to Admission medications    Medication Sig Start Date End Date Taking? Authorizing Provider   amLODIPine (NORVASC) 5 MG tablet Take 1 tablet by mouth Daily.  9/1/22  Yes Marcia Hall MD   atorvastatin (LIPITOR) 10 MG tablet TAKE 1 TABLET BY MOUTH EVERY NIGHT AT BEDTIME 9/19/22  Yes Marcia Hall MD   cetirizine (zyrTEC) 10 MG tablet Take 10 mg by mouth Daily.   Yes ProviderMiah MD   Eliquis 5 MG tablet tablet TAKE 1 TABLET BY MOUTH TWO TIMES A DAY 8/22/22  Yes Marcia Hall MD   losartan (COZAAR) 25 MG tablet TAKE 1 TABLET BY MOUTH EVERY MORNING 9/19/22  Yes Marcia Hall MD   metoprolol succinate XL (TOPROL-XL) 50 MG 24 hr tablet TAKE 1 TABLET BY MOUTH EVERY MORNING, AND 1 TABLET BY MOUTH EVERY EVENING 10/26/22  Yes Marcia Hall MD   multivitamin with minerals tablet tablet Take 1 tablet by mouth Daily.   Yes ProviderMiah MD   sildenafil (REVATIO) 20 MG tablet Take 2 tabs once daily as needed 4/25/22  Yes Andrew Estrada DO   triamterene-hydrochlorothiazide (MAXZIDE-25) 37.5-25 MG per tablet TAKE 1 TABLET BY MOUTH EVERY DAY FOR BLOOD PRESSURE  Patient taking differently: Patient taking every other day per cardioligist 7/12/22  Yes Marcia Hall MD   terazosin (HYTRIN) 2 MG capsule Take 1 capsule by mouth 2 (two) times a day for 90 days. 10/25/21 1/23/22  Andrew Estrada DO        Patient Active Problem List   Diagnosis   • Positive colorectal cancer screening using Cologuard test   • Atrial fibrillation (HCC)   • Benign essential hypertension   • Hyperlipidemia LDL goal <100   • Colon cancer screening   • Allergic rhinitis   • Benign prostatic hyperplasia with weak urinary stream   • Need for influenza vaccination   • Severe obesity (BMI 35.0-39.9) with comorbidity (HCC)   • Tachycardia   • Acute bilateral low back pain without sciatica   • Elevated liver function tests        Past Surgical History:   Procedure Laterality Date   • CARDIOVERSION     • COLONOSCOPY N/A 06/15/2021    Procedure: COLONOSCOPY w/ bx;  Surgeon: Boris Isaac MD;  Location: [facility] ENDOSCOPY;  Service: General;  Laterality: N/A;  colon  "polyp       Social History     Socioeconomic History   • Marital status:    Tobacco Use   • Smoking status: Never   • Smokeless tobacco: Never   Vaping Use   • Vaping Use: Never used   Substance and Sexual Activity   • Alcohol use: Yes     Comment: VERY RARELY   • Drug use: Never   • Sexual activity: Defer       Family History   Problem Relation Age of Onset   • Hypertension Mother    • Heart disease Father    • Hypertension Father    • Hypertension Brother        Family history, surgical history, past medical history, Allergies and meds reviewed with patient today and updated in [facility] EMR.     ROS:  Review of Systems   Constitutional: Negative for fatigue.   HENT: Negative for congestion, nosebleeds, postnasal drip and rhinorrhea.    Eyes: Negative for blurred vision and visual disturbance.   Respiratory: Negative for cough, chest tightness, shortness of breath and wheezing.    Cardiovascular: Negative for chest pain and palpitations.   Gastrointestinal: Negative for abdominal pain, blood in stool, constipation and diarrhea.   Genitourinary: Negative for hematuria.   Allergic/Immunologic: Negative for environmental allergies.   Neurological: Negative for headache.   Psychiatric/Behavioral: Negative for depressed mood. The patient is not nervous/anxious.        OBJECTIVE:  Vitals:    10/31/22 0822   BP: 116/72   BP Location: Left arm   Patient Position: Sitting   Cuff Size: Large Adult   Pulse: 62   Resp: 19   Temp: 97.9 °F (36.6 °C)   TempSrc: Temporal   SpO2: 98%   Weight: 120 kg (264 lb 6.4 oz)   Height: 177.8 cm (70\")     No results found.   Body mass index is 37.94 kg/m².  No LMP for male patient.    Physical Exam  Vitals and nursing note reviewed.   Constitutional:       General: He is not in acute distress.     Appearance: Normal appearance. He is obese.   HENT:      Head: Normocephalic.      Right Ear: Tympanic membrane, ear canal and external ear normal.      Left Ear: Tympanic membrane, ear canal and " external ear normal.      Nose: Nose normal.      Mouth/Throat:      Mouth: Mucous membranes are moist.      Pharynx: Oropharynx is clear.   Eyes:      General: No scleral icterus.     Conjunctiva/sclera: Conjunctivae normal.      Pupils: Pupils are equal, round, and reactive to light.   Cardiovascular:      Rate and Rhythm: Normal rate and regular rhythm.      Pulses: Normal pulses.      Heart sounds: Normal heart sounds. No murmur heard.  Pulmonary:      Effort: Pulmonary effort is normal.      Breath sounds: Normal breath sounds. No wheezing, rhonchi or rales.   Musculoskeletal:      Cervical back: Neck supple. No rigidity or tenderness.   Lymphadenopathy:      Cervical: No cervical adenopathy.   Skin:     General: Skin is warm and dry.      Coloration: Skin is not jaundiced.      Findings: No rash.   Neurological:      General: No focal deficit present.      Mental Status: He is alert and oriented to person, place, and time.   Psychiatric:         Mood and Affect: Mood normal.         Thought Content: Thought content normal.         Judgment: Judgment normal.         Procedures    No visits with results within 30 Day(s) from this visit.   Latest known visit with results is:   Lab on 08/01/2022   Component Date Value Ref Range Status   • Total Cholesterol 08/01/2022 116  0 - 200 mg/dL Final   • Triglycerides 08/01/2022 109  0 - 150 mg/dL Final   • HDL Cholesterol 08/01/2022 41  40 - 60 mg/dL Final   • LDL Cholesterol  08/01/2022 55  0 - 100 mg/dL Final   • VLDL Cholesterol 08/01/2022 20  5 - 40 mg/dL Final   • LDL/HDL Ratio 08/01/2022 1.30   Final   • Glucose 08/01/2022 97  65 - 99 mg/dL Final   • BUN 08/01/2022 12  8 - 23 mg/dL Final   • Creatinine 08/01/2022 1.14  0.76 - 1.27 mg/dL Final   • Sodium 08/01/2022 139  136 - 145 mmol/L Final   • Potassium 08/01/2022 3.8  3.5 - 5.2 mmol/L Final   • Chloride 08/01/2022 103  98 - 107 mmol/L Final   • CO2 08/01/2022 28.4  22.0 - 29.0 mmol/L Final   • Calcium 08/01/2022  9.1  8.6 - 10.5 mg/dL Final   • Total Protein 08/01/2022 6.7  6.0 - 8.5 g/dL Final   • Albumin 08/01/2022 4.20  3.50 - 5.20 g/dL Final   • ALT (SGPT) 08/01/2022 21  1 - 41 U/L Final   • AST (SGOT) 08/01/2022 18  1 - 40 U/L Final   • Alkaline Phosphatase 08/01/2022 103  39 - 117 U/L Final   • Total Bilirubin 08/01/2022 0.6  0.0 - 1.2 mg/dL Final   • Globulin 08/01/2022 2.5  gm/dL Final   • A/G Ratio 08/01/2022 1.7  g/dL Final   • BUN/Creatinine Ratio 08/01/2022 10.5  7.0 - 25.0 Final   • Anion Gap 08/01/2022 7.6  5.0 - 15.0 mmol/L Final   • eGFR 08/01/2022 71.8  >60.0 mL/min/1.73 Final    National Kidney Foundation and American Society of Nephrology (ASN) Task Force recommended calculation based on the Chronic Kidney Disease Epidemiology Collaboration (CKD-EPI) equation refit without adjustment for race.   • Magnesium 08/01/2022 1.9  1.6 - 2.4 mg/dL Final   • Bilirubin, Direct 08/01/2022 <0.2  0.0 - 0.3 mg/dL Final       ASSESSMENT/ PLAN:    Diagnoses and all orders for this visit:    1. Paroxysmal atrial fibrillation (HCC) (Primary)  Assessment & Plan:  Clinically he is doing well.  Clinically he sounds regular at this moment in time.      2. Benign essential hypertension  Assessment & Plan:  His blood pressure is good here today as well as outside the office.  We will continue his meds.  His labs from 3 months ago were all within normal limits      3. Hyperlipidemia LDL goal <100  Assessment & Plan:  His most recent lipid profile was within normal limits.  We will continue his current meds      4. Need for influenza vaccination  -     Cancel: FluLaval/Fluzone >6 mos ([id number])    5. Need for COVID-19 vaccine  -     COVID-19 Bivalent Booster (Pfizer) 12+yrs      Orders Placed Today:     No orders of the defined types were placed in this encounter.       Management Plan:     An After Visit Summary was printed and given to the patient at discharge.    Follow-up: Return in about 6 months (around 4/30/2023) for  Kathleen.    Andrew Estrada,  10/31/2022 10:26 EDT  This note was electronically signed.

## 2022-10-31 NOTE — ASSESSMENT & PLAN NOTE
His blood pressure is good here today as well as outside the office.  We will continue his meds.  His labs from 3 months ago were all within normal limits

## 2022-10-31 NOTE — PROGRESS NOTES
"  [address] [address]  Phone: [phone]  Fax: [phone]      SLEEP CLINIC FOLLOW UP PROGRESS NOTE.    Jerman Caro  [id number]   1960  62 y.o.  male      PCP: Andrew Estrada DO      Date of visit: 10/31/2022    Chief Complaint   Patient presents with   • Sleep Apnea   • Obesity       HPI:  This is a 62 y.o. years old patient is here for the management of obstructive sleep apnea.  . Patient is using positive airway pressure therapy with auto CPAP and the symptoms of snoring, non-restorative sleep and daytime excessive sleepiness have improved significantly on the therapy. Normally goes to bed at 11 PM and wakes up at 5:30 AM.  The patient wakes up 0 time(s) during the night and has no problem going back to sleep.  Feels refreshed after waking up.  Patient also denies headaches and nasal congestion.   He also needs a clearance for CDL.  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     SOCIAL (habits pertaining to sleep medicine)  History tobacco use:No   History of alcohol use: 0 per week  Caffeine use: 2     REVIEW OF SYSTEMS:   [place] Sleepiness Scale :Total score: 8   Nasal congestion:No   Dry mouth/nose:No   Post nasal drip; No   Acid reflux/Heartburn:No   Abd bloating:No   Morning headache:No   Anxiety:No   Depression:No    PHYSICAL EXAMINATION:  CONSTITUTIONAL:  Vitals:    10/31/22 1100   BP: 118/77   Pulse: 65   SpO2: 97%   Weight: 119 kg (262 lb 12.8 oz)   Height: 177.8 cm (70\")    Body mass index is 37.71 kg/m².   NOSE: nasal passages are clear, No deformities noted   RESP SYSTEM: Not in any respiratory distress, no chest deformities noted,   CARDIOVASULAR: No edema noted  NEURO: Oriented x 3, gait normal,  Mood and affect appeared appropriate      Data reviewed:  The Smart card downloaded on 10/31/2022 has been reviewed " independently by me for compliance and discussed the data with the patient.   Compliance; 97%  More than 4 hr use, 80%  Average use of the device 6 hours and 18 per night  Residual AHI: 1.0 /hr (goal < 5.0 /hr)  Mask type: Fullface mask  Device: ResMed  DME: [facility]      ASSESSMENT AND PLAN:  · Obstructive sleep apnea ( G 47.33).  The symptoms of sleep apnea have improved with the device and the treatment.  Patient's compliance with the device is excellent for treatment of sleep apnea.  I have independently reviewed the smart card down load and discussed with the patient the download data and encouarged the patient to continue to use the device.The residual AHI is acceptable. The device is benefiting the patient and the device is medically necessary.  Without proper control of sleep apnea and good compliance there is a increased risk for hypertension, diabetes mellitus and nonrestorative sleep with hypersomnia which can increase risk for motor vehicle accidents.  Untreated sleep apnea is also a risk factor for development of atrial fibrillation, pulmonary hypertension and stroke. The patient is also instructed to get the supplies from the [facility] and and change them on a regular basis.  A prescription for supplies has been sent to the [facility].  I have also discussed the good sleep hygiene habits and adequate amount of sleep needed for good health.  · Obesity  2 with BMI is Body mass index is 37.71 kg/m².. I have discuss the relationship between the weight and sleep apnea. The benefit of weight loss in reducing severity of sleep apnea was discussed. Discussed diet and exercise with the patient to achieve ideal BMI.  · CDL ('s license) clearance. Patient has a history of sleep apnea which is being treated with CPAP.  I have seen the patient today and had a face-to-face conversation.  I have also reviewed the smartcard download from the device which shows good compliance.  Patient's symptoms  have resolved with the use of the device.  Patient does not have any excessive daytime sleepiness and is also getting adequate sleep as per the download.  The residual AHI is acceptable.  I have encourage the patient to continue to use the device as it is benefiting the patient in treating sleep apnea and also controlling symptoms.  Patient is cleared to drive commercial motor vehicles with out any restrictions as long as the device is used on a regular basis.   · Return in about 1 year (around 10/31/2023) for with smart card down load. . Patient's questions were answered.      Brandon Guillermo MD  Sleep Medicine.  Medical Director, [facility] sleep [facility]  10/31/2022 ,

## 2022-11-10 RX ORDER — TERAZOSIN 2 MG/1
CAPSULE ORAL
Qty: 180 CAPSULE | Refills: 3 | Status: SHIPPED | OUTPATIENT
Start: 2022-11-10

## 2022-12-09 RX ORDER — APIXABAN 5 MG/1
TABLET, FILM COATED ORAL
Qty: 180 TABLET | Refills: 2 | Status: SHIPPED | OUTPATIENT
Start: 2022-12-09 | End: 2022-12-13 | Stop reason: SDUPTHER

## 2022-12-13 RX ORDER — APIXABAN 5 MG/1
TABLET, FILM COATED ORAL
Qty: 60 TABLET | OUTPATIENT
Start: 2022-12-13

## 2023-01-11 RX ORDER — TRIAMTERENE AND HYDROCHLOROTHIAZIDE 37.5; 25 MG/1; MG/1
TABLET ORAL
Qty: 90 TABLET | Refills: 1 | Status: SHIPPED | OUTPATIENT
Start: 2023-01-11

## 2023-01-13 ENCOUNTER — OFFICE VISIT (OUTPATIENT)
Dept: FAMILY MEDICINE CLINIC | Facility: CLINIC | Age: 63
End: 2023-01-13
Payer: COMMERCIAL

## 2023-01-13 VITALS
DIASTOLIC BLOOD PRESSURE: 76 MMHG | OXYGEN SATURATION: 98 % | HEART RATE: 66 BPM | SYSTOLIC BLOOD PRESSURE: 125 MMHG | HEIGHT: 70 IN | WEIGHT: 271.6 LBS | TEMPERATURE: 97.3 F | BODY MASS INDEX: 38.88 KG/M2

## 2023-01-13 DIAGNOSIS — I48.0 PAROXYSMAL ATRIAL FIBRILLATION: Primary | Chronic | ICD-10-CM

## 2023-01-13 DIAGNOSIS — E78.5 HYPERLIPIDEMIA LDL GOAL <100: ICD-10-CM

## 2023-01-13 DIAGNOSIS — Z11.59 ENCOUNTER FOR HEPATITIS C SCREENING TEST FOR LOW RISK PATIENT: ICD-10-CM

## 2023-01-13 DIAGNOSIS — I10 BENIGN ESSENTIAL HYPERTENSION: ICD-10-CM

## 2023-01-13 LAB
ANION GAP SERPL CALCULATED.3IONS-SCNC: 5.1 MMOL/L (ref 5–15)
BUN SERPL-MCNC: 16 MG/DL (ref 8–23)
BUN/CREAT SERPL: 16.3 (ref 7–25)
CALCIUM SPEC-SCNC: 9.3 MG/DL (ref 8.6–10.5)
CHLORIDE SERPL-SCNC: 102 MMOL/L (ref 98–107)
CO2 SERPL-SCNC: 30.9 MMOL/L (ref 22–29)
CREAT SERPL-MCNC: 0.98 MG/DL (ref 0.76–1.27)
EGFRCR SERPLBLD CKD-EPI 2021: 86.1 ML/MIN/1.73
GLUCOSE SERPL-MCNC: 73 MG/DL (ref 65–99)
HCV AB SER DONR QL: NORMAL
POTASSIUM SERPL-SCNC: 3.8 MMOL/L (ref 3.5–5.2)
SODIUM SERPL-SCNC: 138 MMOL/L (ref 136–145)

## 2023-01-13 PROCEDURE — 36415 COLL VENOUS BLD VENIPUNCTURE: CPT | Performed by: FAMILY MEDICINE

## 2023-01-13 PROCEDURE — 80048 BASIC METABOLIC PNL TOTAL CA: CPT | Performed by: FAMILY MEDICINE

## 2023-01-13 PROCEDURE — 86803 HEPATITIS C AB TEST: CPT | Performed by: FAMILY MEDICINE

## 2023-01-13 PROCEDURE — 99214 OFFICE O/P EST MOD 30 MIN: CPT | Performed by: FAMILY MEDICINE

## 2023-01-13 NOTE — PROGRESS NOTES
Chief Complaint   Patient presents with   • Follow-up     6 month    • Hypertension   • Hyperlipidemia        Subjective     Jerman Caro  has a past medical history of Atrial fibrillation (HCC), Colon cancer screening (03/17/2021), and Sleep apnea.    Hypertension- he does check his blood pressure at home.  His blood pressures at home have been very similar to here today.  Today were 125/76.    Hyperlipidemia- he is taking his atorvastatin daily.    Atrial fibrillation- he denies any palpitations or tachycardia.  He takes his Eliquis twice a day every day.  He denies any nosebleeds bloody urine or blood per rectum.      PHQ-2 Depression Screening  Little interest or pleasure in doing things?     Feeling down, depressed, or hopeless?     PHQ-2 Total Score     PHQ-9 Depression Screening  Little interest or pleasure in doing things?     Feeling down, depressed, or hopeless?     Trouble falling or staying asleep, or sleeping too much?     Feeling tired or having little energy?     Poor appetite or overeating?     Feeling bad about yourself - or that you are a failure or have let yourself or your family down?     Trouble concentrating on things, such as reading the newspaper or watching television?     Moving or speaking so slowly that other people could have noticed? Or the opposite - being so fidgety or restless that you have been moving around a lot more than usual?     Thoughts that you would be better off dead, or of hurting yourself in some way?     PHQ-9 Total Score     If you checked off any problems, how difficult have these problems made it for you to do your work, take care of things at home, or get along with other people?       No Known Allergies    Prior to Admission medications    Medication Sig Start Date End Date Taking? Authorizing Provider   amLODIPine (NORVASC) 5 MG tablet Take 1 tablet by mouth Daily. 9/1/22  Yes Marcia Hall MD   apixaban (Eliquis) 5 MG tablet tablet Take 1 tablet by mouth 2  (Two) Times a Day. 12/13/22  Yes Gabriel Bennett MD   atorvastatin (LIPITOR) 10 MG tablet TAKE 1 TABLET BY MOUTH EVERY NIGHT AT BEDTIME 9/19/22  Yes Marcia Hall MD   cetirizine (zyrTEC) 10 MG tablet Take 10 mg by mouth Daily.   Yes Provider, MD Miah   losartan (COZAAR) 25 MG tablet TAKE 1 TABLET BY MOUTH EVERY MORNING 9/19/22  Yes Marcia Hall MD   metoprolol succinate XL (TOPROL-XL) 50 MG 24 hr tablet TAKE 1 TABLET BY MOUTH EVERY MORNING, AND 1 TABLET BY MOUTH EVERY EVENING 10/26/22  Yes Marcia Hall MD   multivitamin with minerals tablet tablet Take 1 tablet by mouth Daily.   Yes Provider, MD Miah   sildenafil (REVATIO) 20 MG tablet Take 2 tabs once daily as needed 4/25/22  Yes Andrew Estrada DO   terazosin (HYTRIN) 2 MG capsule TAKE 1 CAPSULE BY MOUTH TWO TIMES A DAY 11/10/22  Yes Andrew Estrada DO   triamterene-hydrochlorothiazide (MAXZIDE-25) 37.5-25 MG per tablet TAKE 1 TABLET BY MOUTH EVERY DAY FOR BLOOD PRESSURE 1/11/23  Yes Marcia Hall MD        Patient Active Problem List   Diagnosis   • Positive colorectal cancer screening using Cologuard test   • Atrial fibrillation (HCC)   • Benign essential hypertension   • Hyperlipidemia LDL goal <100   • Colon cancer screening   • Allergic rhinitis   • Benign prostatic hyperplasia with weak urinary stream   • Need for influenza vaccination   • Severe obesity (BMI 35.0-39.9) with comorbidity (HCC)   • Tachycardia   • Acute bilateral low back pain without sciatica   • Elevated liver function tests   • AHSAN on CPAP   • Class 2 obesity   • DWDL lymphoma (HCC)   • Encounter for CDL (commercial driving license) exam   • Encounter for hepatitis C screening test for low risk patient        Past Surgical History:   Procedure Laterality Date   • CARDIOVERSION     • COLONOSCOPY N/A 06/15/2021    Procedure: COLONOSCOPY w/ bx;  Surgeon: Boris Isaac MD;  Location: [facility] ENDOSCOPY;  Service: General;  Laterality: N/A;   "colon polyp       Social History     Socioeconomic History   • Marital status:    Tobacco Use   • Smoking status: Never   • Smokeless tobacco: Never   Vaping Use   • Vaping Use: Never used   Substance and Sexual Activity   • Alcohol use: Yes     Comment: VERY RARELY   • Drug use: Never   • Sexual activity: Defer       Family History   Problem Relation Age of Onset   • Hypertension Mother    • Heart disease Father    • Hypertension Father    • Hypertension Brother        Family history, surgical history, past medical history, Allergies and meds reviewed with patient today and updated in [facility] EMR.     ROS:  Review of Systems   Constitutional: Negative for fatigue.   HENT: Negative for congestion, nosebleeds, postnasal drip and rhinorrhea.    Eyes: Negative for blurred vision and visual disturbance.   Respiratory: Negative for cough, chest tightness, shortness of breath and wheezing.    Cardiovascular: Negative for chest pain and palpitations.   Gastrointestinal: Negative for blood in stool, constipation and diarrhea.   Allergic/Immunologic: Negative for environmental allergies.   Neurological: Negative for headache.   Psychiatric/Behavioral: Negative for depressed mood. The patient is not nervous/anxious.        OBJECTIVE:  Vitals:    01/13/23 0801   BP: 125/76   BP Location: Right arm   Patient Position: Sitting   Pulse: 66   Temp: 97.3 °F (36.3 °C)   SpO2: 98%   Weight: 123 kg (271 lb 9.6 oz)   Height: 177.8 cm (70\")     No results found.   Body mass index is 38.97 kg/m².  No LMP for male patient.    Physical Exam  Vitals and nursing note reviewed.   Constitutional:       General: He is not in acute distress.     Appearance: Normal appearance. He is obese.   HENT:      Head: Normocephalic.      Right Ear: Tympanic membrane, ear canal and external ear normal.      Left Ear: Tympanic membrane, ear canal and external ear normal.      Nose: Nose normal.      Mouth/Throat:      Mouth: Mucous membranes are moist. "      Pharynx: Oropharynx is clear.   Eyes:      General: No scleral icterus.     Conjunctiva/sclera: Conjunctivae normal.      Pupils: Pupils are equal, round, and reactive to light.   Cardiovascular:      Rate and Rhythm: Normal rate and regular rhythm.      Pulses: Normal pulses.      Heart sounds: Normal heart sounds. No murmur heard.  Pulmonary:      Effort: Pulmonary effort is normal.      Breath sounds: Normal breath sounds. No wheezing, rhonchi or rales.   Musculoskeletal:      Cervical back: Neck supple. No rigidity or tenderness.   Lymphadenopathy:      Cervical: No cervical adenopathy.   Skin:     General: Skin is warm and dry.      Coloration: Skin is not jaundiced.      Findings: No rash.   Neurological:      General: No focal deficit present.      Mental Status: He is alert and oriented to person, place, and time.   Psychiatric:         Mood and Affect: Mood normal.         Thought Content: Thought content normal.         Judgment: Judgment normal.         Procedures    No visits with results within 30 Day(s) from this visit.   Latest known visit with results is:   Lab on 08/01/2022   Component Date Value Ref Range Status   • Total Cholesterol 08/01/2022 116  0 - 200 mg/dL Final   • Triglycerides 08/01/2022 109  0 - 150 mg/dL Final   • HDL Cholesterol 08/01/2022 41  40 - 60 mg/dL Final   • LDL Cholesterol  08/01/2022 55  0 - 100 mg/dL Final   • VLDL Cholesterol 08/01/2022 20  5 - 40 mg/dL Final   • LDL/HDL Ratio 08/01/2022 1.30   Final   • Glucose 08/01/2022 97  65 - 99 mg/dL Final   • BUN 08/01/2022 12  8 - 23 mg/dL Final   • Creatinine 08/01/2022 1.14  0.76 - 1.27 mg/dL Final   • Sodium 08/01/2022 139  136 - 145 mmol/L Final   • Potassium 08/01/2022 3.8  3.5 - 5.2 mmol/L Final   • Chloride 08/01/2022 103  98 - 107 mmol/L Final   • CO2 08/01/2022 28.4  22.0 - 29.0 mmol/L Final   • Calcium 08/01/2022 9.1  8.6 - 10.5 mg/dL Final   • Total Protein 08/01/2022 6.7  6.0 - 8.5 g/dL Final   • Albumin  08/01/2022 4.20  3.50 - 5.20 g/dL Final   • ALT (SGPT) 08/01/2022 21  1 - 41 U/L Final   • AST (SGOT) 08/01/2022 18  1 - 40 U/L Final   • Alkaline Phosphatase 08/01/2022 103  39 - 117 U/L Final   • Total Bilirubin 08/01/2022 0.6  0.0 - 1.2 mg/dL Final   • Globulin 08/01/2022 2.5  gm/dL Final   • A/G Ratio 08/01/2022 1.7  g/dL Final   • BUN/Creatinine Ratio 08/01/2022 10.5  7.0 - 25.0 Final   • Anion Gap 08/01/2022 7.6  5.0 - 15.0 mmol/L Final   • eGFR 08/01/2022 71.8  >60.0 mL/min/1.73 Final    National Kidney Foundation and American Society of Nephrology (ASN) Task Force recommended calculation based on the Chronic Kidney Disease Epidemiology Collaboration (CKD-EPI) equation refit without adjustment for race.   • Magnesium 08/01/2022 1.9  1.6 - 2.4 mg/dL Final   • Bilirubin, Direct 08/01/2022 <0.2  0.0 - 0.3 mg/dL Final       ASSESSMENT/ PLAN:    Diagnoses and all orders for this visit:    1. Paroxysmal atrial fibrillation (HCC) (Primary)  Assessment & Plan:  He clinically is in a normal rhythm today.  We will continue his current meds.      2. Benign essential hypertension  Assessment & Plan:  His blood pressure is good here as well as at home.  We will continue his current meds and update his BMP    Orders:  -     Basic Metabolic Panel    3. Hyperlipidemia LDL goal <100  Assessment & Plan:  His lipids have been good.  We will skip this visit.      4. Encounter for hepatitis C screening test for low risk patient  -     Hepatitis C Antibody      Orders Placed Today:     No orders of the defined types were placed in this encounter.       Management Plan:     An After Visit Summary was printed and given to the patient at discharge.    Follow-up: Return in about 6 months (around 7/13/2023) for Recheck.    Andrew Estrada DO 1/13/2023 08:17 EST  This note was electronically signed.

## 2023-01-13 NOTE — ASSESSMENT & PLAN NOTE
His blood pressure is good here as well as at home.  We will continue his current meds and update his BMP

## 2023-06-07 ENCOUNTER — OFFICE VISIT (OUTPATIENT)
Dept: CARDIOLOGY | Facility: CLINIC | Age: 63
End: 2023-06-07
Payer: MEDICARE

## 2023-06-07 VITALS
WEIGHT: 284 LBS | DIASTOLIC BLOOD PRESSURE: 78 MMHG | BODY MASS INDEX: 40.66 KG/M2 | HEIGHT: 70 IN | SYSTOLIC BLOOD PRESSURE: 122 MMHG | HEART RATE: 57 BPM

## 2023-06-07 DIAGNOSIS — E78.2 MIXED DYSLIPIDEMIA: ICD-10-CM

## 2023-06-07 DIAGNOSIS — I10 ESSENTIAL HYPERTENSION: ICD-10-CM

## 2023-06-07 DIAGNOSIS — I48.0 PAROXYSMAL ATRIAL FIBRILLATION: Primary | ICD-10-CM

## 2023-06-07 PROCEDURE — 3074F SYST BP LT 130 MM HG: CPT | Performed by: INTERNAL MEDICINE

## 2023-06-07 PROCEDURE — 99213 OFFICE O/P EST LOW 20 MIN: CPT | Performed by: INTERNAL MEDICINE

## 2023-06-07 PROCEDURE — 3078F DIAST BP <80 MM HG: CPT | Performed by: INTERNAL MEDICINE

## 2023-06-07 NOTE — PROGRESS NOTES
Chief Complaint  Atrial Fibrillation, Hypertension, Follow-up, and Hyperlipidemia    Subjective      Patient is here for follow-up on paroxysmal atrial fibrillation and hypertension.  He has been doing quite well.  He has no complaints or concerns today.  He has no chest comfort or dyspnea.  He has no palpitations, orthopnea, dizziness, presyncope or syncope.  He is compliant with CPAP therapy.  He is compliant with Eliquis.  He denies bleeding or other side effects.    Past Medical History:   Diagnosis Date    Atrial fibrillation     Colon cancer screening 03/17/2021    POSITIVE- REFERRED FOR COLONOSCOPY    Sleep apnea     USES CPAP         Current Outpatient Medications:     amLODIPine (NORVASC) 5 MG tablet, Take 1 tablet by mouth Daily., Disp: 90 tablet, Rfl: 3    apixaban (Eliquis) 5 MG tablet tablet, Take 1 tablet by mouth 2 (Two) Times a Day., Disp: 180 tablet, Rfl: 2    atorvastatin (LIPITOR) 10 MG tablet, TAKE 1 TABLET BY MOUTH EVERY NIGHT AT BEDTIME, Disp: 90 tablet, Rfl: 3    cetirizine (zyrTEC) 10 MG tablet, Take 1 tablet by mouth Daily., Disp: , Rfl:     losartan (COZAAR) 25 MG tablet, TAKE 1 TABLET BY MOUTH EVERY MORNING, Disp: 90 tablet, Rfl: 3    metoprolol succinate XL (TOPROL-XL) 50 MG 24 hr tablet, TAKE 1 TABLET BY MOUTH EVERY MORNING, AND 1 TABLET BY MOUTH EVERY EVENING, Disp: 180 tablet, Rfl: 2    multivitamin with minerals tablet tablet, Take 1 tablet by mouth Daily., Disp: , Rfl:     sildenafil (REVATIO) 20 MG tablet, Take 2 tabs once daily as needed, Disp: 30 tablet, Rfl: 5    terazosin (HYTRIN) 2 MG capsule, TAKE 1 CAPSULE BY MOUTH TWO TIMES A DAY, Disp: 180 capsule, Rfl: 3    triamterene-hydrochlorothiazide (MAXZIDE-25) 37.5-25 MG per tablet, TAKE 1 TABLET BY MOUTH EVERY DAY FOR BLOOD PRESSURE, Disp: 90 tablet, Rfl: 1    There are no discontinued medications.  No Known Allergies     Social History     Tobacco Use    Smoking status: Never    Smokeless tobacco: Never   Vaping Use    Vaping  "Use: Never used   Substance Use Topics    Alcohol use: Yes     Comment: VERY RARELY    Drug use: Never       Family History   Problem Relation Age of Onset    Hypertension Mother     Heart disease Father     Hypertension Father     Hypertension Brother         Objective     /78   Pulse 57   Ht 177.8 cm (70\")   Wt 129 kg (284 lb)   BMI 40.75 kg/m²       Physical Exam    General Appearance:   no acute distress  Alert and oriented x3  HENT:   lips not cyanotic  Atraumatic  Neck:  No jvd   supple  Respiratory:  no respiratory distress  normal breath sounds  no rales  Cardiovascular:  no S3, no S4   no murmur  no rub  Extremities  No cyanosis  lower extremity edema: none    Skin:   warm, dry  No rashes      Result Review :     No results found for: PROBNP  CMP          8/1/2022    09:52 1/13/2023    08:37   CMP   Glucose 97  73    BUN 12  16    Creatinine 1.14  0.98    EGFR 71.8  86.1    Sodium 139  138    Potassium 3.8  3.8    Chloride 103  102    Calcium 9.1  9.3    Total Protein 6.7     Albumin 4.20     Globulin 2.5     Total Bilirubin 0.6     Alkaline Phosphatase 103     AST (SGOT) 18     ALT (SGPT) 21     Albumin/Globulin Ratio 1.7     BUN/Creatinine Ratio 10.5  16.3    Anion Gap 7.6  5.1         Lab Results   Component Value Date    TSH 1.330 06/07/2022      Lab Results   Component Value Date    FREET4 1.05 06/07/2022      No results found for: DDIMERQUANT  Magnesium   Date Value Ref Range Status   08/01/2022 1.9 1.6 - 2.4 mg/dL Final      No results found for: DIGOXIN   Lab Results   Component Value Date    TROPONINT <0.01 04/08/2021           Lipid Panel          8/1/2022    09:52   Lipid Panel   Total Cholesterol 116    Triglycerides 109    HDL Cholesterol 41    VLDL Cholesterol 20    LDL Cholesterol  55    LDL/HDL Ratio 1.30      No results found for: POCTROP                   Diagnoses and all orders for this visit:    1. Paroxysmal atrial fibrillation (Primary)    2. Essential hypertension    3. " Mixed dyslipidemia      Assessment:    -Paroxysmal atrial fibrillation: Currently in normal sinus rhythm.  He is asymptomatic.  Continue metoprolol and Eliquis.    -Essential hypertension: Well-controlled on current regimen.  Continue the same.    -Mixed dyslipidemia: On atorvastatin.  Continue the same.      Follow Up     Return in about 1 year (around 6/7/2024) for with Dr Bennett.        Patient was given instructions and counseling regarding his condition or for health maintenance advice. Please see specific information pulled into the AVS if appropriate.

## 2023-07-14 PROBLEM — R63.1 POLYDIPSIA: Status: ACTIVE | Noted: 2023-07-14

## 2023-07-26 RX ORDER — METOPROLOL SUCCINATE 50 MG/1
TABLET, EXTENDED RELEASE ORAL
Qty: 60 TABLET | Refills: 3 | Status: SHIPPED | OUTPATIENT
Start: 2023-07-26

## 2023-07-27 ENCOUNTER — TELEPHONE (OUTPATIENT)
Dept: CARDIOLOGY | Facility: CLINIC | Age: 63
End: 2023-07-27
Payer: COMMERCIAL

## 2023-07-27 NOTE — TELEPHONE ENCOUNTER
[facility] received a fax that requires your attention. The document has been indexed to the patient’s chart for your review.      Reason for sending: EXTERNAL MEDICAL RECORD NOTIFICATION     Documents Description: METOPROLOL REFILL     Name of Sender: [employer]     Date Indexed: 7.27.23

## 2023-08-24 RX ORDER — TRIAMTERENE AND HYDROCHLOROTHIAZIDE 37.5; 25 MG/1; MG/1
TABLET ORAL
Qty: 90 TABLET | Refills: 1 | Status: SHIPPED | OUTPATIENT
Start: 2023-08-24

## 2023-09-14 RX ORDER — APIXABAN 5 MG/1
TABLET, FILM COATED ORAL
Qty: 180 TABLET | Refills: 3 | Status: SHIPPED | OUTPATIENT
Start: 2023-09-14

## 2023-09-14 RX ORDER — ATORVASTATIN CALCIUM 10 MG/1
TABLET, FILM COATED ORAL
Qty: 90 TABLET | Refills: 3 | Status: SHIPPED | OUTPATIENT
Start: 2023-09-14

## 2023-09-14 RX ORDER — LOSARTAN POTASSIUM 25 MG/1
25 TABLET ORAL EVERY MORNING
Qty: 90 TABLET | Refills: 3 | Status: SHIPPED | OUTPATIENT
Start: 2023-09-14

## 2023-10-03 RX ORDER — TERAZOSIN 2 MG/1
CAPSULE ORAL
Qty: 60 CAPSULE | Refills: 3 | OUTPATIENT
Start: 2023-10-03

## 2023-11-21 ENCOUNTER — TELEPHONE (OUTPATIENT)
Dept: CARDIOLOGY | Facility: CLINIC | Age: 63
End: 2023-11-21
Payer: COMMERCIAL

## 2023-11-21 RX ORDER — AMLODIPINE BESYLATE 5 MG/1
5 TABLET ORAL DAILY
Qty: 90 TABLET | Refills: 2 | Status: SHIPPED | OUTPATIENT
Start: 2023-11-21

## 2023-11-21 RX ORDER — METOPROLOL SUCCINATE 50 MG/1
TABLET, EXTENDED RELEASE ORAL
Qty: 180 TABLET | Refills: 2 | Status: SHIPPED | OUTPATIENT
Start: 2023-11-21

## 2023-11-21 NOTE — TELEPHONE ENCOUNTER
[facility] received a fax that requires your attention. The document has been indexed to the patient’s chart for your review.      Reason for sending: EXTERNAL MEDICAL RECORD NOTIFICATION     Documents Description:     Name of Sender: [employer] REFILL-11.20.23    Date Indexed: 11.20.23

## 2023-12-18 ENCOUNTER — TELEPHONE (OUTPATIENT)
Dept: CARDIOLOGY | Facility: CLINIC | Age: 63
End: 2023-12-18
Payer: COMMERCIAL

## 2023-12-18 RX ORDER — TERAZOSIN 5 MG/1
5 CAPSULE ORAL 2 TIMES DAILY
Qty: 180 CAPSULE | Refills: 1 | Status: SHIPPED | OUTPATIENT
Start: 2023-12-18

## 2023-12-18 NOTE — TELEPHONE ENCOUNTER
[facility] received a fax that requires your attention. The document has been indexed to the patient’s chart for your review.      Reason for sending: EXTERNAL MEDICAL RECORD NOTIFICATION     Documents Description: MEDS-ELIQUIS REFILL-12.17.23    Name of Sender: [employer]     Date Indexed: 12.17.23     Home Suture Removal Text: Patient was provided instructions on removing sutures and will remove their sutures at home.  If they have any questions or difficulties they will call the office.

## 2024-01-06 ENCOUNTER — DOCUMENTATION (OUTPATIENT)
Dept: FAMILY MEDICINE CLINIC | Facility: CLINIC | Age: 64
End: 2024-01-06
Payer: COMMERCIAL

## 2024-01-06 RX ORDER — AMOXICILLIN AND CLAVULANATE POTASSIUM 875; 125 MG/1; MG/1
1 TABLET, FILM COATED ORAL 2 TIMES DAILY
Qty: 14 TABLET | Refills: 0 | Status: SHIPPED | OUTPATIENT
Start: 2024-01-06 | End: 2024-01-13

## 2024-01-24 ENCOUNTER — TELEPHONE (OUTPATIENT)
Dept: CARDIOLOGY | Facility: CLINIC | Age: 64
End: 2024-01-24
Payer: COMMERCIAL

## 2024-01-24 NOTE — TELEPHONE ENCOUNTER
[facility] received a fax that requires your attention. The document has been indexed to the patient’s chart for your review.      Reason for sending: EXTERNAL MEDICAL RECORD NOTIFICATION     Documents Description: MEDS-ELIQUIS REFILL-1.24.24    Name of Sender: [facility]     Date Indexed: 1.24.24

## 2024-01-31 RX ORDER — TRIAMTERENE AND HYDROCHLOROTHIAZIDE 37.5; 25 MG/1; MG/1
1 TABLET ORAL DAILY
Qty: 90 TABLET | Refills: 1 | Status: SHIPPED | OUTPATIENT
Start: 2024-01-31

## 2024-02-05 ENCOUNTER — TRANSCRIBE ORDERS (OUTPATIENT)
Dept: ADMINISTRATIVE | Facility: HOSPITAL | Age: 64
End: 2024-02-05
Payer: COMMERCIAL

## 2024-02-05 DIAGNOSIS — E55.9 VITAMIN D DEFICIENCY: ICD-10-CM

## 2024-02-05 DIAGNOSIS — R73.9 HYPERGLYCEMIA: ICD-10-CM

## 2024-02-05 DIAGNOSIS — R53.83 OTHER FATIGUE: ICD-10-CM

## 2024-02-05 DIAGNOSIS — I10 ESSENTIAL HYPERTENSION, MALIGNANT: ICD-10-CM

## 2024-02-05 DIAGNOSIS — Z12.5 SCREENING FOR MALIGNANT NEOPLASM OF PROSTATE: ICD-10-CM

## 2024-02-26 ENCOUNTER — LAB (OUTPATIENT)
Dept: LAB | Facility: HOSPITAL | Age: 64
End: 2024-02-26
Payer: MEDICARE

## 2024-02-26 DIAGNOSIS — E55.9 VITAMIN D DEFICIENCY: ICD-10-CM

## 2024-02-26 DIAGNOSIS — R53.83 OTHER FATIGUE: ICD-10-CM

## 2024-02-26 DIAGNOSIS — I10 ESSENTIAL HYPERTENSION, MALIGNANT: ICD-10-CM

## 2024-02-26 DIAGNOSIS — Z12.5 SCREENING FOR MALIGNANT NEOPLASM OF PROSTATE: ICD-10-CM

## 2024-02-26 DIAGNOSIS — R73.9 HYPERGLYCEMIA: ICD-10-CM

## 2024-02-26 LAB
25(OH)D3 SERPL-MCNC: 23.6 NG/ML (ref 30–100)
ALBUMIN SERPL-MCNC: 4 G/DL (ref 3.5–5.2)
ALBUMIN UR-MCNC: <1.2 MG/DL
ALBUMIN/GLOB SERPL: 1.5 G/DL
ALP SERPL-CCNC: 84 U/L (ref 39–117)
ALT SERPL W P-5'-P-CCNC: 28 U/L (ref 1–41)
ANION GAP SERPL CALCULATED.3IONS-SCNC: 8.2 MMOL/L (ref 5–15)
AST SERPL-CCNC: 19 U/L (ref 1–40)
BACTERIA UR QL AUTO: NORMAL /HPF
BASOPHILS # BLD AUTO: 0.03 10*3/MM3 (ref 0–0.2)
BASOPHILS NFR BLD AUTO: 0.5 % (ref 0–1.5)
BILIRUB SERPL-MCNC: 0.5 MG/DL (ref 0–1.2)
BILIRUB UR QL STRIP: NEGATIVE
BUN SERPL-MCNC: 16 MG/DL (ref 8–23)
BUN/CREAT SERPL: 17 (ref 7–25)
CALCIUM SPEC-SCNC: 8.7 MG/DL (ref 8.6–10.5)
CHLORIDE SERPL-SCNC: 107 MMOL/L (ref 98–107)
CHOLEST SERPL-MCNC: 123 MG/DL (ref 0–200)
CLARITY UR: CLEAR
CO2 SERPL-SCNC: 24.8 MMOL/L (ref 22–29)
COLOR UR: YELLOW
CREAT SERPL-MCNC: 0.94 MG/DL (ref 0.76–1.27)
DEPRECATED RDW RBC AUTO: 39 FL (ref 37–54)
EGFRCR SERPLBLD CKD-EPI 2021: 90 ML/MIN/1.73
EOSINOPHIL # BLD AUTO: 0.01 10*3/MM3 (ref 0–0.4)
EOSINOPHIL NFR BLD AUTO: 0.2 % (ref 0.3–6.2)
ERYTHROCYTE [DISTWIDTH] IN BLOOD BY AUTOMATED COUNT: 13 % (ref 12.3–15.4)
FOLATE SERPL-MCNC: 13.9 NG/ML (ref 4.78–24.2)
GLOBULIN UR ELPH-MCNC: 2.7 GM/DL
GLUCOSE SERPL-MCNC: 99 MG/DL (ref 65–99)
GLUCOSE UR STRIP-MCNC: NEGATIVE MG/DL
HBA1C MFR BLD: 5.6 % (ref 4.8–5.6)
HCT VFR BLD AUTO: 40.9 % (ref 37.5–51)
HDLC SERPL-MCNC: 44 MG/DL (ref 40–60)
HGB BLD-MCNC: 13.6 G/DL (ref 13–17.7)
HGB UR QL STRIP.AUTO: NEGATIVE
HYALINE CASTS UR QL AUTO: NORMAL /LPF
IMM GRANULOCYTES # BLD AUTO: 0.01 10*3/MM3 (ref 0–0.05)
IMM GRANULOCYTES NFR BLD AUTO: 0.2 % (ref 0–0.5)
KETONES UR QL STRIP: NEGATIVE
LDLC SERPL CALC-MCNC: 59 MG/DL (ref 0–100)
LDLC/HDLC SERPL: 1.31 {RATIO}
LEUKOCYTE ESTERASE UR QL STRIP.AUTO: NEGATIVE
LYMPHOCYTES # BLD AUTO: 1.33 10*3/MM3 (ref 0.7–3.1)
LYMPHOCYTES NFR BLD AUTO: 20.9 % (ref 19.6–45.3)
MCH RBC QN AUTO: 27.8 PG (ref 26.6–33)
MCHC RBC AUTO-ENTMCNC: 33.3 G/DL (ref 31.5–35.7)
MCV RBC AUTO: 83.6 FL (ref 79–97)
MONOCYTES # BLD AUTO: 0.36 10*3/MM3 (ref 0.1–0.9)
MONOCYTES NFR BLD AUTO: 5.7 % (ref 5–12)
NEUTROPHILS NFR BLD AUTO: 4.62 10*3/MM3 (ref 1.7–7)
NEUTROPHILS NFR BLD AUTO: 72.5 % (ref 42.7–76)
NITRITE UR QL STRIP: NEGATIVE
NRBC BLD AUTO-RTO: 0 /100 WBC (ref 0–0.2)
PH UR STRIP.AUTO: 5.5 [PH] (ref 5–8)
PLATELET # BLD AUTO: 161 10*3/MM3 (ref 140–450)
PMV BLD AUTO: 11 FL (ref 6–12)
POTASSIUM SERPL-SCNC: 3.6 MMOL/L (ref 3.5–5.2)
PROT SERPL-MCNC: 6.7 G/DL (ref 6–8.5)
PROT UR QL STRIP: NEGATIVE
PSA SERPL-MCNC: 1.41 NG/ML (ref 0–4)
RBC # BLD AUTO: 4.89 10*6/MM3 (ref 4.14–5.8)
RBC # UR STRIP: NORMAL /HPF
REF LAB TEST METHOD: NORMAL
SODIUM SERPL-SCNC: 140 MMOL/L (ref 136–145)
SP GR UR STRIP: 1.03 (ref 1–1.03)
SQUAMOUS #/AREA URNS HPF: NORMAL /HPF
TRIGL SERPL-MCNC: 106 MG/DL (ref 0–150)
TSH SERPL DL<=0.05 MIU/L-ACNC: 1.76 UIU/ML (ref 0.27–4.2)
UROBILINOGEN UR QL STRIP: NORMAL
VIT B12 BLD-MCNC: 631 PG/ML (ref 211–946)
VLDLC SERPL-MCNC: 20 MG/DL (ref 5–40)
WBC # UR STRIP: NORMAL /HPF
WBC NRBC COR # BLD AUTO: 6.36 10*3/MM3 (ref 3.4–10.8)

## 2024-02-26 PROCEDURE — 82746 ASSAY OF FOLIC ACID SERUM: CPT

## 2024-02-26 PROCEDURE — 81001 URINALYSIS AUTO W/SCOPE: CPT

## 2024-02-26 PROCEDURE — 85025 COMPLETE CBC W/AUTO DIFF WBC: CPT

## 2024-02-26 PROCEDURE — 84443 ASSAY THYROID STIM HORMONE: CPT

## 2024-02-26 PROCEDURE — 83036 HEMOGLOBIN GLYCOSYLATED A1C: CPT

## 2024-02-26 PROCEDURE — 36415 COLL VENOUS BLD VENIPUNCTURE: CPT

## 2024-02-26 PROCEDURE — 82607 VITAMIN B-12: CPT

## 2024-02-26 PROCEDURE — 82043 UR ALBUMIN QUANTITATIVE: CPT

## 2024-02-26 PROCEDURE — G0103 PSA SCREENING: HCPCS

## 2024-02-26 PROCEDURE — 82306 VITAMIN D 25 HYDROXY: CPT

## 2024-02-26 PROCEDURE — 80061 LIPID PANEL: CPT

## 2024-02-26 PROCEDURE — 80053 COMPREHEN METABOLIC PANEL: CPT

## 2024-02-28 ENCOUNTER — OFFICE VISIT (OUTPATIENT)
Dept: SLEEP MEDICINE | Facility: HOSPITAL | Age: 64
End: 2024-02-28
Payer: MEDICARE

## 2024-02-28 VITALS
WEIGHT: 281.5 LBS | OXYGEN SATURATION: 94 % | BODY MASS INDEX: 40.3 KG/M2 | HEART RATE: 63 BPM | HEIGHT: 70 IN | DIASTOLIC BLOOD PRESSURE: 75 MMHG | SYSTOLIC BLOOD PRESSURE: 117 MMHG

## 2024-02-28 DIAGNOSIS — G47.33 OSA ON CPAP: Primary | ICD-10-CM

## 2024-02-28 DIAGNOSIS — E66.9 CLASS 2 OBESITY: ICD-10-CM

## 2024-02-28 PROCEDURE — G0463 HOSPITAL OUTPT CLINIC VISIT: HCPCS

## 2024-02-28 NOTE — PROGRESS NOTES
"  [address] [address]  Phone: [phone]  Fax: [phone]      SLEEP CLINIC FOLLOW UP PROGRESS NOTE.    Jerman Caro  [id number]   1960  63 y.o.  male      PCP: Giovanny Elliott MD      Date of visit: 2/28/2024    Chief Complaint   Patient presents with    Sleep Apnea    Obesity       HPI:  This is a 63 y.o. years old patient is here for the management of obstructive sleep apnea.  . Patient is using positive airway pressure therapy with auto CPAP and the symptoms of snoring, non-restorative sleep and daytime excessive sleepiness have improved significantly on the therapy. Normally goes to bed at 11 PM and wakes up at 5:30 AM.  The patient wakes up 0 time(s) during the night and has no problem going back to sleep.  Feels refreshed after waking up.  Patient also denies headaches and nasal congestion.   He also needs a clearance for CDL.  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     SOCIAL (habits pertaining to sleep medicine)  History tobacco use:No   History of alcohol use: 0 per week  Caffeine use: 2     REVIEW OF SYSTEMS:   [place] Sleepiness Scale :Total score: 8   Nasal congestion:No   Dry mouth/nose:No   Post nasal drip; No   Acid reflux/Heartburn:No   Abd bloating:No   Morning headache:No   Anxiety:No   Depression:No    PHYSICAL EXAMINATION:  CONSTITUTIONAL:  Vitals:    02/28/24 0900   BP: 117/75   Pulse: 63   SpO2: 94%   Weight: 128 kg (281 lb 8 oz)   Height: 177.8 cm (70\")    Body mass index is 40.39 kg/m².   NOSE: nasal passages are clear, No deformities noted   RESP SYSTEM: Not in any respiratory distress, no chest deformities noted,   CARDIOVASULAR: No edema noted  NEURO: Oriented x 3, gait normal,  Mood and affect appeared appropriate      Will " review his download in about 4 weeks to reassess and give him a letter for CDL    ASSESSMENT AND PLAN:  Obstructive sleep apnea ( G 47.33).  The symptoms of sleep apnea have improved with the device and the treatment.  Patient's compliance with the device is excellent for treatment of sleep apnea.  I have independently reviewed the smart card down load and discussed with the patient the download data and encouarged the patient to continue to use the device.The residual AHI is acceptable. The device is benefiting the patient and the device is medically necessary.  Without proper control of sleep apnea and good compliance there is a increased risk for hypertension, diabetes mellitus and nonrestorative sleep with hypersomnia which can increase risk for motor vehicle accidents.  Untreated sleep apnea is also a risk factor for development of atrial fibrillation, pulmonary hypertension and stroke. The patient is also instructed to get the supplies from the [employer] and and change them on a regular basis.  A prescription for supplies has been sent to the [employer] company.  I have also discussed the good sleep hygiene habits and adequate amount of sleep needed for good health.  Obesity  2 with BMI is Body mass index is 40.39 kg/m².. I have discuss the relationship between the weight and sleep apnea. The benefit of weight loss in reducing severity of sleep apnea was discussed. Discussed diet and exercise with the patient to achieve ideal BMI.  Return in about 1 year (around 2/28/2025) for with smart card down load. . Patient's questions were answered.      Brandon Guillermo MD  Sleep Medicine.  Medical Director, [facility]  2/28/2024 ,

## 2024-06-12 ENCOUNTER — OFFICE VISIT (OUTPATIENT)
Dept: CARDIOLOGY | Facility: CLINIC | Age: 64
End: 2024-06-12
Payer: MEDICARE

## 2024-06-12 VITALS
DIASTOLIC BLOOD PRESSURE: 93 MMHG | HEART RATE: 66 BPM | BODY MASS INDEX: 40.29 KG/M2 | WEIGHT: 281.4 LBS | HEIGHT: 70 IN | SYSTOLIC BLOOD PRESSURE: 151 MMHG

## 2024-06-12 DIAGNOSIS — I10 BENIGN ESSENTIAL HYPERTENSION: ICD-10-CM

## 2024-06-12 DIAGNOSIS — E78.5 HYPERLIPIDEMIA LDL GOAL <100: ICD-10-CM

## 2024-06-12 DIAGNOSIS — I48.0 PAROXYSMAL ATRIAL FIBRILLATION: Primary | Chronic | ICD-10-CM

## 2024-06-12 NOTE — PROGRESS NOTES
Chief Complaint  Hypertension, Hyperlipidemia, and Follow-up (1 year f/u.  Pt takes his bp at home on a daily basis and according to him, it is well controlled. )    Subjective        History of Present Illness  Jerman Caro presents to [facility] CARDIOLOGY for follow up.   History of Present Illness  Patient is a 64-year-old male with past medical history outlined below, significant for paroxysmal atrial fibrillation and hypertension who presents for routine follow-up.  He is doing well from a cardiac standpoint.  He denies any chest pain or discomfort, dyspnea, orthopnea, palpitations, dizziness or lightheadedness.      Past Medical History:   Diagnosis Date    Atrial fibrillation     Colon cancer screening 03/17/2021    POSITIVE- REFERRED FOR COLONOSCOPY    Sleep apnea     USES CPAP       ALLERGY  No Known Allergies     Past Surgical History:   Procedure Laterality Date    CARDIOVERSION      COLONOSCOPY N/A 06/15/2021    Procedure: COLONOSCOPY w/ bx;  Surgeon: Boris Isaac MD;  Location: [facility] ENDOSCOPY;  Service: General;  Laterality: N/A;  colon polyp        Social History     Socioeconomic History    Marital status:    Tobacco Use    Smoking status: Never    Smokeless tobacco: Never   Vaping Use    Vaping status: Never Used   Substance and Sexual Activity    Alcohol use: Yes     Comment: VERY RARELY    Drug use: Never    Sexual activity: Defer       Family History   Problem Relation Age of Onset    Hypertension Mother     Heart disease Father     Hypertension Father     Hypertension Brother         Current Outpatient Medications on File Prior to Visit   Medication Sig    amLODIPine (NORVASC) 5 MG tablet TAKE 1 TABLET BY MOUTH EVERY DAY    apixaban (Eliquis) 5 MG tablet tablet TAKE 1 TABLET BY MOUTH TWO TIMES A DAY    atorvastatin (LIPITOR) 10 MG tablet TAKE 1 TABLET BY MOUTH EVERY NIGHT AT BEDTIME    cetirizine (zyrTEC) 10 MG tablet Take 1 tablet by mouth Daily.    losartan  "(COZAAR) 25 MG tablet TAKE 1 TABLET BY MOUTH EVERY MORNING    metoprolol succinate XL (TOPROL-XL) 50 MG 24 hr tablet TAKE 1 TABLET BY MOUTH EVERY MORNING, AND 1 TABLET BY MOUTH EVERY EVENING    multivitamin with minerals tablet tablet Take 1 tablet by mouth Daily.    terazosin (HYTRIN) 5 MG capsule Take 1 capsule by mouth 2 (Two) Times a Day.    triamterene-hydrochlorothiazide (MAXZIDE-25) 37.5-25 MG per tablet TAKE 1 TABLET BY MOUTH DAILY     No current facility-administered medications on file prior to visit.       Objective   Vitals:    06/12/24 1015   BP: 151/93   Pulse: 66   Weight: 128 kg (281 lb 6.4 oz)   Height: 177.8 cm (70\")       Physical Exam  Constitutional:       General: He is awake. He is not in acute distress.     Appearance: Normal appearance.   HENT:      Head: Normocephalic.      Nose: Nose normal. No congestion.   Eyes:      Extraocular Movements: Extraocular movements intact.      Conjunctiva/sclera: Conjunctivae normal.      Pupils: Pupils are equal, round, and reactive to light.   Neck:      Thyroid: No thyromegaly.      Vascular: No JVD.   Cardiovascular:      Rate and Rhythm: Normal rate and regular rhythm.      Chest Wall: PMI is not displaced.      Pulses: Normal pulses.      Heart sounds: Normal heart sounds, S1 normal and S2 normal. No murmur heard.     No friction rub. No gallop. No S3 or S4 sounds.   Pulmonary:      Effort: Pulmonary effort is normal.      Breath sounds: Normal breath sounds. No wheezing, rhonchi or rales.   Abdominal:      General: Bowel sounds are normal.      Palpations: Abdomen is soft.      Tenderness: There is no abdominal tenderness.   Musculoskeletal:      Cervical back: No tenderness.      Right lower leg: No edema.      Left lower leg: No edema.   Lymphadenopathy:      Cervical: No cervical adenopathy.   Skin:     General: Skin is warm and dry.      Capillary Refill: Capillary refill takes less than 2 seconds.      Coloration: Skin is not cyanotic.      " Findings: No petechiae or rash.      Nails: There is no clubbing.   Neurological:      Mental Status: He is alert.   Psychiatric:         Mood and Affect: Mood normal.         Behavior: Behavior is cooperative.           Result Review     The following data was reviewed by OSCAR Fuentes on 06/12/24.      CMP          7/14/2023    08:28 2/26/2024    06:51   CMP   Glucose 101  99    BUN 16  16    Creatinine 1.06  0.94    EGFR 77.9  90.0    Sodium 142  140    Potassium 3.9  3.6    Chloride 105  107    Calcium 9.3  8.7    Total Protein 6.9  6.7    Albumin 4.1  4.0    Globulin 2.8  2.7    Total Bilirubin 0.5  0.5    Alkaline Phosphatase 108  84    AST (SGOT) 28  19    ALT (SGPT) 39  28    Albumin/Globulin Ratio 1.5  1.5    BUN/Creatinine Ratio 15.1  17.0    Anion Gap 10.5  8.2      CBC w/diff          2/26/2024    06:51   CBC w/Diff   WBC 6.36    RBC 4.89    Hemoglobin 13.6    Hematocrit 40.9    MCV 83.6    MCH 27.8    MCHC 33.3    RDW 13.0    Platelets 161    Neutrophil Rel % 72.5    Immature Granulocyte Rel % 0.2    Lymphocyte Rel % 20.9    Monocyte Rel % 5.7    Eosinophil Rel % 0.2    Basophil Rel % 0.5       Lipid Panel          7/14/2023    08:28 2/26/2024    06:51   Lipid Panel   Total Cholesterol 127  123    Triglycerides 68  106    HDL Cholesterol 46  44    VLDL Cholesterol 14  20    LDL Cholesterol  67  59    LDL/HDL Ratio 1.47  1.31            === Results for orders placed in visit on 04/12/22 ===    CARDIAC EVENT MONITOR    - Interpretation Summary -  · An abnormal monitor study.  · On 4/26/2022, patient had a bout of atrial fibrillation at 8:31 PM for approximately 30 seconds.          Procedures    Assessment & Plan  Diagnoses and all orders for this visit:    1. Paroxysmal atrial fibrillation (Primary)    2. Benign essential hypertension    3. Hyperlipidemia LDL goal <100      Assessment & Plan      1.  In a normal rhythm on exam today.  Continue Eliquis 5 mg twice daily for stroke risk  reduction.  Continue metoprolol 50 mg daily for rate control.  2.  Stable on current regimen which will be continued.  3.  Continue the atorvastatin.    The medical services provided during this encounter are part of ongoing care related to this patient's single serious condition or complex condition.        Follow Up   Return in about 1 year (around 6/12/2025) for With OSCAR Vilchis.    Patient was given instructions and counseling regarding his condition or for health maintenance advice. Please see specific information pulled into the AVS if appropriate.         OSCAR Fuentes  06/12/24  12:15 EDT    Dictated Utilizing Dragon Dictation

## 2024-06-13 ENCOUNTER — TRANSCRIBE ORDERS (OUTPATIENT)
Dept: ADMINISTRATIVE | Facility: HOSPITAL | Age: 64
End: 2024-06-13
Payer: COMMERCIAL

## 2024-06-13 ENCOUNTER — HOSPITAL ENCOUNTER (OUTPATIENT)
Dept: GENERAL RADIOLOGY | Facility: HOSPITAL | Age: 64
Discharge: HOME | End: 2024-06-13
Payer: MEDICARE

## 2024-06-13 DIAGNOSIS — M25.552 LEFT HIP PAIN: ICD-10-CM

## 2024-06-13 DIAGNOSIS — M54.51 VERTEBROGENIC LOW BACK PAIN: Primary | ICD-10-CM

## 2024-06-13 DIAGNOSIS — M54.51 VERTEBROGENIC LOW BACK PAIN: ICD-10-CM

## 2024-06-13 PROCEDURE — 72100 X-RAY EXAM L-S SPINE 2/3 VWS: CPT

## 2024-06-13 PROCEDURE — 73502 X-RAY EXAM HIP UNI 2-3 VIEWS: CPT

## 2024-07-11 RX ORDER — TERAZOSIN 5 MG/1
CAPSULE ORAL
Qty: 180 CAPSULE | Refills: 1 | Status: SHIPPED | OUTPATIENT
Start: 2024-07-11

## 2024-08-05 ENCOUNTER — TRANSCRIBE ORDERS (OUTPATIENT)
Dept: ADMINISTRATIVE | Facility: HOSPITAL | Age: 64
End: 2024-08-05
Payer: COMMERCIAL

## 2024-08-05 DIAGNOSIS — R53.83 OTHER FATIGUE: ICD-10-CM

## 2024-08-05 DIAGNOSIS — E55.9 VITAMIN D DEFICIENCY: Primary | ICD-10-CM

## 2024-08-05 DIAGNOSIS — I10 ESSENTIAL HYPERTENSION, MALIGNANT: ICD-10-CM

## 2024-08-28 ENCOUNTER — TELEPHONE (OUTPATIENT)
Dept: CARDIOLOGY | Facility: CLINIC | Age: 64
End: 2024-08-28
Payer: COMMERCIAL

## 2024-08-28 NOTE — TELEPHONE ENCOUNTER
METOPROLOL REFILL REQUESTED.  DR CHAUDHARI PREVIOUS PRESCRIBER.   Patient was pending with [facility] services prior to admission to the hospital. Referral also was set up with [facility] for TPN at home. Will continue to follow patient.

## 2024-08-28 NOTE — TELEPHONE ENCOUNTER
[facility] received a fax that requires your attention. The document has been indexed to the patient’s chart for your review.      Reason for sending: EXTERNAL MEDICAL RECORD NOTIFICATION     Documents Description: METOPROLOL REFILL-[facility]-8.27.24    Name of Sender: [facility]     Date Indexed: 8.27.24

## 2024-08-29 RX ORDER — AMLODIPINE BESYLATE 5 MG/1
5 TABLET ORAL DAILY
Qty: 90 TABLET | Refills: 3 | Status: SHIPPED | OUTPATIENT
Start: 2024-08-29

## 2024-08-29 RX ORDER — METOPROLOL SUCCINATE 50 MG/1
TABLET, EXTENDED RELEASE ORAL
Qty: 180 TABLET | Refills: 3 | Status: SHIPPED | OUTPATIENT
Start: 2024-08-29

## 2024-09-13 RX ORDER — LOSARTAN POTASSIUM 25 MG/1
25 TABLET ORAL EVERY MORNING
Qty: 90 TABLET | Refills: 3 | Status: SHIPPED | OUTPATIENT
Start: 2024-09-13

## 2024-09-13 RX ORDER — ATORVASTATIN CALCIUM 10 MG/1
10 TABLET, FILM COATED ORAL
Qty: 90 TABLET | Refills: 3 | Status: SHIPPED | OUTPATIENT
Start: 2024-09-13

## 2024-09-13 RX ORDER — TRIAMTERENE/HYDROCHLOROTHIAZID 37.5-25 MG
1 TABLET ORAL DAILY
Qty: 90 TABLET | Refills: 3 | Status: SHIPPED | OUTPATIENT
Start: 2024-09-13

## 2024-09-13 NOTE — TELEPHONE ENCOUNTER
LOTUS FROM [facility] CALLED AND LEFT  REQUESTING REFILLS FOR PT.  ELIQUIS, ATORVASTATIN, LOSARTAN, AND MAXZIDE.  DR CHAUDHARI WAS PREVIOUS PRESCRIBER.

## 2024-09-25 ENCOUNTER — LAB (OUTPATIENT)
Dept: LAB | Facility: HOSPITAL | Age: 64
End: 2024-09-25
Payer: MEDICARE

## 2024-09-25 DIAGNOSIS — I10 ESSENTIAL HYPERTENSION, MALIGNANT: ICD-10-CM

## 2024-09-25 DIAGNOSIS — E55.9 VITAMIN D DEFICIENCY: ICD-10-CM

## 2024-09-25 DIAGNOSIS — R53.83 OTHER FATIGUE: ICD-10-CM

## 2024-09-25 LAB
25(OH)D3 SERPL-MCNC: 53.7 NG/ML (ref 30–100)
ALBUMIN SERPL-MCNC: 4.1 G/DL (ref 3.5–5.2)
ALBUMIN/GLOB SERPL: 1.7 G/DL
ALP SERPL-CCNC: 82 U/L (ref 39–117)
ALT SERPL W P-5'-P-CCNC: 32 U/L (ref 1–41)
ANION GAP SERPL CALCULATED.3IONS-SCNC: 8.7 MMOL/L (ref 5–15)
AST SERPL-CCNC: 28 U/L (ref 1–40)
BACTERIA UR QL AUTO: NORMAL /HPF
BASOPHILS # BLD AUTO: 0.03 10*3/MM3 (ref 0–0.2)
BASOPHILS NFR BLD AUTO: 0.5 % (ref 0–1.5)
BILIRUB SERPL-MCNC: 0.4 MG/DL (ref 0–1.2)
BILIRUB UR QL STRIP: NEGATIVE
BUN SERPL-MCNC: 15 MG/DL (ref 8–23)
BUN/CREAT SERPL: 16.1 (ref 7–25)
CALCIUM SPEC-SCNC: 9 MG/DL (ref 8.6–10.5)
CHLORIDE SERPL-SCNC: 106 MMOL/L (ref 98–107)
CHOLEST SERPL-MCNC: 118 MG/DL (ref 0–200)
CLARITY UR: CLEAR
CO2 SERPL-SCNC: 25.3 MMOL/L (ref 22–29)
COLOR UR: YELLOW
CREAT SERPL-MCNC: 0.93 MG/DL (ref 0.76–1.27)
DEPRECATED RDW RBC AUTO: 39.8 FL (ref 37–54)
EGFRCR SERPLBLD CKD-EPI 2021: 90.6 ML/MIN/1.73
EOSINOPHIL # BLD AUTO: 0.01 10*3/MM3 (ref 0–0.4)
EOSINOPHIL NFR BLD AUTO: 0.2 % (ref 0.3–6.2)
ERYTHROCYTE [DISTWIDTH] IN BLOOD BY AUTOMATED COUNT: 12.7 % (ref 12.3–15.4)
FOLATE SERPL-MCNC: 15.6 NG/ML (ref 4.78–24.2)
GLOBULIN UR ELPH-MCNC: 2.4 GM/DL
GLUCOSE SERPL-MCNC: 107 MG/DL (ref 65–99)
GLUCOSE UR STRIP-MCNC: NEGATIVE MG/DL
HCT VFR BLD AUTO: 42.8 % (ref 37.5–51)
HDLC SERPL-MCNC: 42 MG/DL (ref 40–60)
HGB BLD-MCNC: 13.7 G/DL (ref 13–17.7)
HGB UR QL STRIP.AUTO: NEGATIVE
HYALINE CASTS UR QL AUTO: NORMAL /LPF
IMM GRANULOCYTES # BLD AUTO: 0.02 10*3/MM3 (ref 0–0.05)
IMM GRANULOCYTES NFR BLD AUTO: 0.4 % (ref 0–0.5)
KETONES UR QL STRIP: ABNORMAL
LDLC SERPL CALC-MCNC: 58 MG/DL (ref 0–100)
LDLC/HDLC SERPL: 1.37 {RATIO}
LEUKOCYTE ESTERASE UR QL STRIP.AUTO: ABNORMAL
LYMPHOCYTES # BLD AUTO: 1.05 10*3/MM3 (ref 0.7–3.1)
LYMPHOCYTES NFR BLD AUTO: 19.2 % (ref 19.6–45.3)
MCH RBC QN AUTO: 27.6 PG (ref 26.6–33)
MCHC RBC AUTO-ENTMCNC: 32 G/DL (ref 31.5–35.7)
MCV RBC AUTO: 86.3 FL (ref 79–97)
MONOCYTES # BLD AUTO: 0.42 10*3/MM3 (ref 0.1–0.9)
MONOCYTES NFR BLD AUTO: 7.7 % (ref 5–12)
NEUTROPHILS NFR BLD AUTO: 3.95 10*3/MM3 (ref 1.7–7)
NEUTROPHILS NFR BLD AUTO: 72 % (ref 42.7–76)
NITRITE UR QL STRIP: NEGATIVE
NRBC BLD AUTO-RTO: 0 /100 WBC (ref 0–0.2)
PH UR STRIP.AUTO: 5.5 [PH] (ref 5–8)
PLATELET # BLD AUTO: 159 10*3/MM3 (ref 140–450)
PMV BLD AUTO: 11.8 FL (ref 6–12)
POTASSIUM SERPL-SCNC: 3.9 MMOL/L (ref 3.5–5.2)
PROT SERPL-MCNC: 6.5 G/DL (ref 6–8.5)
PROT UR QL STRIP: NEGATIVE
RBC # BLD AUTO: 4.96 10*6/MM3 (ref 4.14–5.8)
RBC # UR STRIP: NORMAL /HPF
REF LAB TEST METHOD: NORMAL
SODIUM SERPL-SCNC: 140 MMOL/L (ref 136–145)
SP GR UR STRIP: 1.03 (ref 1–1.03)
SQUAMOUS #/AREA URNS HPF: NORMAL /HPF
TRIGL SERPL-MCNC: 93 MG/DL (ref 0–150)
TSH SERPL DL<=0.05 MIU/L-ACNC: 1.7 UIU/ML (ref 0.27–4.2)
UROBILINOGEN UR QL STRIP: ABNORMAL
VIT B12 BLD-MCNC: 589 PG/ML (ref 211–946)
VLDLC SERPL-MCNC: 18 MG/DL (ref 5–40)
WBC # UR STRIP: NORMAL /HPF
WBC NRBC COR # BLD AUTO: 5.48 10*3/MM3 (ref 3.4–10.8)

## 2024-09-25 PROCEDURE — 81001 URINALYSIS AUTO W/SCOPE: CPT

## 2024-09-25 PROCEDURE — 80053 COMPREHEN METABOLIC PANEL: CPT

## 2024-09-25 PROCEDURE — 82306 VITAMIN D 25 HYDROXY: CPT

## 2024-09-25 PROCEDURE — 36415 COLL VENOUS BLD VENIPUNCTURE: CPT

## 2024-09-25 PROCEDURE — 85025 COMPLETE CBC W/AUTO DIFF WBC: CPT

## 2024-09-25 PROCEDURE — 80061 LIPID PANEL: CPT

## 2024-09-25 PROCEDURE — 82746 ASSAY OF FOLIC ACID SERUM: CPT

## 2024-09-25 PROCEDURE — 84443 ASSAY THYROID STIM HORMONE: CPT

## 2024-09-25 PROCEDURE — 82607 VITAMIN B-12: CPT

## 2025-01-21 ENCOUNTER — TRANSCRIBE ORDERS (OUTPATIENT)
Dept: GENERAL RADIOLOGY | Facility: HOSPITAL | Age: 65
End: 2025-01-21
Payer: COMMERCIAL

## 2025-01-21 ENCOUNTER — HOSPITAL ENCOUNTER (OUTPATIENT)
Dept: GENERAL RADIOLOGY | Facility: HOSPITAL | Age: 65
Discharge: HOME | End: 2025-01-21
Admitting: FAMILY MEDICINE
Payer: MEDICARE

## 2025-01-21 DIAGNOSIS — R05.9 COUGH, UNSPECIFIED TYPE: Primary | ICD-10-CM

## 2025-01-21 DIAGNOSIS — R05.9 COUGH, UNSPECIFIED TYPE: ICD-10-CM

## 2025-01-21 PROCEDURE — 71046 X-RAY EXAM CHEST 2 VIEWS: CPT

## 2025-03-03 ENCOUNTER — LAB (OUTPATIENT)
Facility: HOSPITAL | Age: 65
End: 2025-03-03
Payer: MEDICARE

## 2025-03-03 ENCOUNTER — HOSPITAL ENCOUNTER (OUTPATIENT)
Dept: CARDIOLOGY | Facility: HOSPITAL | Age: 65
Discharge: HOME | End: 2025-03-03
Payer: MEDICARE

## 2025-03-03 ENCOUNTER — TRANSCRIBE ORDERS (OUTPATIENT)
Dept: ADMINISTRATIVE | Facility: HOSPITAL | Age: 65
End: 2025-03-03
Payer: COMMERCIAL

## 2025-03-03 DIAGNOSIS — E55.9 VITAMIN D DEFICIENCY: ICD-10-CM

## 2025-03-03 DIAGNOSIS — I48.0 PAROXYSMAL ATRIAL FIBRILLATION: ICD-10-CM

## 2025-03-03 DIAGNOSIS — I48.0 PAROXYSMAL ATRIAL FIBRILLATION: Primary | ICD-10-CM

## 2025-03-03 DIAGNOSIS — I10 ESSENTIAL HYPERTENSION, MALIGNANT: ICD-10-CM

## 2025-03-03 DIAGNOSIS — R53.83 OTHER FATIGUE: ICD-10-CM

## 2025-03-03 DIAGNOSIS — R73.9 HYPERGLYCEMIA: ICD-10-CM

## 2025-03-03 LAB
25(OH)D3 SERPL-MCNC: 59.6 NG/ML (ref 30–100)
ALBUMIN SERPL-MCNC: 4.1 G/DL (ref 3.5–5.2)
ALBUMIN/GLOB SERPL: 1.6 G/DL
ALP SERPL-CCNC: 78 U/L (ref 39–117)
ALT SERPL W P-5'-P-CCNC: 29 U/L (ref 1–41)
ANION GAP SERPL CALCULATED.3IONS-SCNC: 11 MMOL/L (ref 5–15)
AST SERPL-CCNC: 23 U/L (ref 1–40)
BILIRUB SERPL-MCNC: 0.3 MG/DL (ref 0–1.2)
BILIRUB UR QL STRIP: NEGATIVE
BUN SERPL-MCNC: 15 MG/DL (ref 8–23)
BUN/CREAT SERPL: 13.8 (ref 7–25)
CALCIUM SPEC-SCNC: 8.9 MG/DL (ref 8.6–10.5)
CHLORIDE SERPL-SCNC: 104 MMOL/L (ref 98–107)
CLARITY UR: CLEAR
CO2 SERPL-SCNC: 24 MMOL/L (ref 22–29)
COLOR UR: YELLOW
CREAT SERPL-MCNC: 1.09 MG/DL (ref 0.76–1.27)
EGFRCR SERPLBLD CKD-EPI 2021: 74.9 ML/MIN/1.73
FOLATE SERPL-MCNC: 19.9 NG/ML (ref 4.78–24.2)
GLOBULIN UR ELPH-MCNC: 2.6 GM/DL
GLUCOSE SERPL-MCNC: 82 MG/DL (ref 65–99)
GLUCOSE UR STRIP-MCNC: NEGATIVE MG/DL
HBA1C MFR BLD: 5.5 % (ref 4.8–5.6)
HGB UR QL STRIP.AUTO: NEGATIVE
KETONES UR QL STRIP: NEGATIVE
LEUKOCYTE ESTERASE UR QL STRIP.AUTO: NEGATIVE
MAGNESIUM SERPL-MCNC: 2.1 MG/DL (ref 1.6–2.4)
NITRITE UR QL STRIP: NEGATIVE
PH UR STRIP.AUTO: 5.5 [PH] (ref 5–8)
POTASSIUM SERPL-SCNC: 3.8 MMOL/L (ref 3.5–5.2)
PROT SERPL-MCNC: 6.7 G/DL (ref 6–8.5)
PROT UR QL STRIP: NEGATIVE
QT INTERVAL: 383 MS
QTC INTERVAL: 486 MS
SODIUM SERPL-SCNC: 139 MMOL/L (ref 136–145)
SP GR UR STRIP: 1.02 (ref 1–1.03)
T4 FREE SERPL-MCNC: 1.09 NG/DL (ref 0.92–1.68)
TSH SERPL DL<=0.05 MIU/L-ACNC: 0.99 UIU/ML (ref 0.27–4.2)
UROBILINOGEN UR QL STRIP: NORMAL
VIT B12 BLD-MCNC: 785 PG/ML (ref 211–946)

## 2025-03-03 PROCEDURE — 82746 ASSAY OF FOLIC ACID SERUM: CPT

## 2025-03-03 PROCEDURE — 36415 COLL VENOUS BLD VENIPUNCTURE: CPT

## 2025-03-03 PROCEDURE — 84443 ASSAY THYROID STIM HORMONE: CPT

## 2025-03-03 PROCEDURE — 83735 ASSAY OF MAGNESIUM: CPT

## 2025-03-03 PROCEDURE — 93005 ELECTROCARDIOGRAM TRACING: CPT | Performed by: FAMILY MEDICINE

## 2025-03-03 PROCEDURE — 81003 URINALYSIS AUTO W/O SCOPE: CPT

## 2025-03-03 PROCEDURE — 80053 COMPREHEN METABOLIC PANEL: CPT

## 2025-03-03 PROCEDURE — 82607 VITAMIN B-12: CPT

## 2025-03-03 PROCEDURE — 84439 ASSAY OF FREE THYROXINE: CPT

## 2025-03-03 PROCEDURE — 82306 VITAMIN D 25 HYDROXY: CPT

## 2025-03-03 PROCEDURE — 83036 HEMOGLOBIN GLYCOSYLATED A1C: CPT

## 2025-03-04 ENCOUNTER — OFFICE VISIT (OUTPATIENT)
Dept: CARDIOLOGY | Facility: CLINIC | Age: 65
End: 2025-03-04
Payer: MEDICARE

## 2025-03-04 VITALS
HEIGHT: 70 IN | DIASTOLIC BLOOD PRESSURE: 83 MMHG | HEART RATE: 91 BPM | SYSTOLIC BLOOD PRESSURE: 123 MMHG | WEIGHT: 267 LBS | BODY MASS INDEX: 38.22 KG/M2

## 2025-03-04 DIAGNOSIS — E78.5 HYPERLIPIDEMIA LDL GOAL <100: ICD-10-CM

## 2025-03-04 DIAGNOSIS — I48.0 PAROXYSMAL ATRIAL FIBRILLATION: Primary | ICD-10-CM

## 2025-03-04 DIAGNOSIS — I10 BENIGN ESSENTIAL HYPERTENSION: ICD-10-CM

## 2025-03-04 PROCEDURE — 1159F MED LIST DOCD IN RCRD: CPT

## 2025-03-04 PROCEDURE — 1160F RVW MEDS BY RX/DR IN RCRD: CPT

## 2025-03-04 PROCEDURE — 99214 OFFICE O/P EST MOD 30 MIN: CPT

## 2025-03-04 PROCEDURE — 3079F DIAST BP 80-89 MM HG: CPT

## 2025-03-04 PROCEDURE — G2211 COMPLEX E/M VISIT ADD ON: HCPCS

## 2025-03-04 PROCEDURE — 3074F SYST BP LT 130 MM HG: CPT

## 2025-03-04 NOTE — PROGRESS NOTES
Chief Complaint  Atrial Fibrillation (Has been in afib foe a week ), Hypertension, and Hyperlipidemia    Subjective        History of Present Illness  Jerman Caro presents to [facility] CARDIOLOGY for follow up.   Patient is a 64-year-old male with past medical history outlined below, significant for paroxysmal atrial fibrillation and hypertension who presents for routine follow-up.  He reports about a week ago he felt like he went back into A-fib.  He has not been in A-fib for a couple years since his previous cardioversion.  He is symptomatic with a can feel the beats and his heart and has some chest discomfort although it is not tightness or pressure it is just an uncomfortable feeling in his chest.  He is not dyspneic.  He denies any edema, dizziness or syncope.    Past Medical History:   Diagnosis Date    Atrial fibrillation     Colon cancer screening 03/17/2021    POSITIVE- REFERRED FOR COLONOSCOPY    Sleep apnea     USES CPAP       ALLERGY  No Known Allergies     Past Surgical History:   Procedure Laterality Date    CARDIOVERSION      COLONOSCOPY N/A 06/15/2021    Procedure: COLONOSCOPY w/ bx;  Surgeon: Boris Isaac MD;  Location: [facility] ENDOSCOPY;  Service: General;  Laterality: N/A;  colon polyp        Social History     Socioeconomic History    Marital status:    Tobacco Use    Smoking status: Never    Smokeless tobacco: Never   Vaping Use    Vaping status: Never Used   Substance and Sexual Activity    Alcohol use: Yes     Comment: VERY RARELY    Drug use: Never    Sexual activity: Defer       Family History   Problem Relation Age of Onset    Hypertension Mother     Heart disease Father     Hypertension Father     Hypertension Brother         Current Outpatient Medications on File Prior to Visit   Medication Sig    amLODIPine (NORVASC) 5 MG tablet TAKE 1 TABLET BY MOUTH EVERY DAY    apixaban (Eliquis) 5 MG tablet tablet Take 1 tablet by mouth 2 (Two) Times a Day.     "atorvastatin (LIPITOR) 10 MG tablet Take 1 tablet by mouth every night at bedtime.    cetirizine (zyrTEC) 10 MG tablet Take 1 tablet by mouth Daily.    losartan (COZAAR) 25 MG tablet Take 1 tablet by mouth Every Morning.    metoprolol succinate XL (TOPROL-XL) 50 MG 24 hr tablet TAKE 1 TABLET BY MOUTH EVERY MORNING, AND 1 TABLET BY MOUTH EVERY EVENING    multivitamin with minerals tablet tablet Take 1 tablet by mouth Daily.    terazosin (HYTRIN) 5 MG capsule TAKE 1 CAPSULE BY MOUTH 2 TIMES DAILY    triamterene-hydrochlorothiazide (MAXZIDE-25) 37.5-25 MG per tablet Take 1 tablet by mouth Daily.     No current facility-administered medications on file prior to visit.       Objective   Vitals:    03/04/25 0953   BP: 123/83   Pulse: 91   Weight: 121 kg (267 lb)   Height: 177.8 cm (70\")       Physical Exam  Constitutional:       General: He is awake. He is not in acute distress.     Appearance: Normal appearance.   HENT:      Head: Normocephalic.      Nose: Nose normal. No congestion.   Eyes:      Extraocular Movements: Extraocular movements intact.      Conjunctiva/sclera: Conjunctivae normal.      Pupils: Pupils are equal, round, and reactive to light.   Neck:      Thyroid: No thyromegaly.      Vascular: No JVD.   Cardiovascular:      Rate and Rhythm: Normal rate. Rhythm irregular.      Chest Wall: PMI is not displaced.      Pulses: Normal pulses.      Heart sounds: Normal heart sounds, S1 normal and S2 normal. No murmur heard.     No friction rub. No gallop. No S3 or S4 sounds.   Pulmonary:      Effort: Pulmonary effort is normal.      Breath sounds: Normal breath sounds. No wheezing, rhonchi or rales.   Abdominal:      General: Bowel sounds are normal.      Palpations: Abdomen is soft.      Tenderness: There is no abdominal tenderness.   Musculoskeletal:      Cervical back: No tenderness.      Right lower leg: No edema.      Left lower leg: No edema.   Lymphadenopathy:      Cervical: No cervical adenopathy.   Skin:    "  General: Skin is warm and dry.      Capillary Refill: Capillary refill takes less than 2 seconds.      Coloration: Skin is not cyanotic.      Findings: No petechiae or rash.      Nails: There is no clubbing.   Neurological:      Mental Status: He is alert.   Psychiatric:         Mood and Affect: Mood normal.         Behavior: Behavior is cooperative.           Result Review     The following data was reviewed by OSCAR Fuentes on 03/04/25.      CMP          9/25/2024    06:46 3/3/2025    16:02   CMP   Glucose 107  82    BUN 15  15    Creatinine 0.93  1.09    EGFR 90.6  74.9    Sodium 140  139    Potassium 3.9  3.8    Chloride 106  104    Calcium 9.0  8.9    Total Protein 6.5  6.7    Albumin 4.1  4.1    Globulin 2.4  2.6    Total Bilirubin 0.4  0.3    Alkaline Phosphatase 82  78    AST (SGOT) 28  23    ALT (SGPT) 32  29    Albumin/Globulin Ratio 1.7  1.6    BUN/Creatinine Ratio 16.1  13.8    Anion Gap 8.7  11.0      CBC w/diff          9/25/2024    06:46   CBC w/Diff   WBC 5.48    RBC 4.96    Hemoglobin 13.7    Hematocrit 42.8    MCV 86.3    MCH 27.6    MCHC 32.0    RDW 12.7    Platelets 159    Neutrophil Rel % 72.0    Immature Granulocyte Rel % 0.4    Lymphocyte Rel % 19.2    Monocyte Rel % 7.7    Eosinophil Rel % 0.2    Basophil Rel % 0.5       Lipid Panel          9/25/2024    06:46   Lipid Panel   Total Cholesterol 118    Triglycerides 93    HDL Cholesterol 42    VLDL Cholesterol 18    LDL Cholesterol  58    LDL/HDL Ratio 1.37            === Results for orders placed in visit on 04/12/22 ===    CARDIAC EVENT MONITOR    - Interpretation Summary -  · An abnormal monitor study.  · On 4/26/2022, patient had a bout of atrial fibrillation at 8:31 PM for approximately 30 seconds.          Procedures      Assessment & Plan  Paroxysmal atrial fibrillation  In atrial fibrillation by exam today.  Continue Eliquis 5 mg twice daily.  He has been on uninterrupted anticoagulation for couple years.  He does have  palpitations and is symptomatic.  Will get him scheduled for repeat cardioversion with Dr. Long.  We did discuss if this does not keep him out of A-fib we can consider antiarrhythmics.  He has no history of CAD or structural heart disease.  Benign essential hypertension  Blood pressure is well-controlled.  Continue metoprolol he is currently taking 75 mg at night and 25 mg in the morning.  Continue losartan.  Hyperlipidemia LDL goal <100  Continue statin therapy.                 The medical services provided during this encounter are part of ongoing care related to this patient's single serious condition or complex condition.    Follow Up   Return in about 2 weeks (around 3/18/2025) for For EKG Only.    Patient was given instructions and counseling regarding his condition or for health maintenance advice. Please see specific information pulled into the AVS if appropriate.     Bernadette Gloria, OSCAR  03/04/25  10:01 EST    Dictated Utilizing Dragon Dictation

## 2025-03-04 NOTE — ASSESSMENT & PLAN NOTE
Blood pressure is well-controlled.  Continue metoprolol he is currently taking 75 mg at night and 25 mg in the morning.  Continue losartan.

## 2025-03-04 NOTE — ASSESSMENT & PLAN NOTE
In atrial fibrillation by exam today.  Continue Eliquis 5 mg twice daily.  He has been on uninterrupted anticoagulation for couple years.  He does have palpitations and is symptomatic.  Will get him scheduled for repeat cardioversion with Dr. Long.  We did discuss if this does not keep him out of A-fib we can consider antiarrhythmics.  He has no history of CAD or structural heart disease.

## 2025-03-12 ENCOUNTER — HOSPITAL ENCOUNTER (OUTPATIENT)
Dept: CARDIOLOGY | Facility: HOSPITAL | Age: 65
Discharge: HOME | End: 2025-03-12
Admitting: INTERNAL MEDICINE
Payer: MEDICARE

## 2025-03-12 VITALS
HEART RATE: 82 BPM | DIASTOLIC BLOOD PRESSURE: 81 MMHG | SYSTOLIC BLOOD PRESSURE: 120 MMHG | RESPIRATION RATE: 17 BRPM | OXYGEN SATURATION: 97 %

## 2025-03-12 DIAGNOSIS — I48.0 PAROXYSMAL ATRIAL FIBRILLATION: ICD-10-CM

## 2025-03-12 PROCEDURE — 25010000002 MORPHINE PER 10 MG: Performed by: INTERNAL MEDICINE

## 2025-03-12 PROCEDURE — 25010000002 DIPHENHYDRAMINE PER 50 MG: Performed by: INTERNAL MEDICINE

## 2025-03-12 PROCEDURE — 25010000002 MIDAZOLAM PER 1MG: Performed by: INTERNAL MEDICINE

## 2025-03-12 PROCEDURE — 92960 CARDIOVERSION ELECTRIC EXT: CPT

## 2025-03-12 PROCEDURE — 99152 MOD SED SAME PHYS/QHP 5/>YRS: CPT

## 2025-03-12 RX ORDER — MORPHINE SULFATE 2 MG/ML
INJECTION, SOLUTION INTRAMUSCULAR; INTRAVENOUS
Status: COMPLETED | OUTPATIENT
Start: 2025-03-12 | End: 2025-03-12

## 2025-03-12 RX ORDER — MORPHINE SULFATE 2 MG/ML
INJECTION, SOLUTION INTRAMUSCULAR; INTRAVENOUS
Status: DISPENSED
Start: 2025-03-12 | End: 2025-03-12

## 2025-03-12 RX ORDER — MIDAZOLAM HYDROCHLORIDE 2 MG/2ML
INJECTION, SOLUTION INTRAMUSCULAR; INTRAVENOUS
Status: DISPENSED
Start: 2025-03-12 | End: 2025-03-12

## 2025-03-12 RX ORDER — MIDAZOLAM HYDROCHLORIDE 2 MG/2ML
INJECTION, SOLUTION INTRAMUSCULAR; INTRAVENOUS
Status: COMPLETED | OUTPATIENT
Start: 2025-03-12 | End: 2025-03-12

## 2025-03-12 RX ORDER — DIPHENHYDRAMINE HYDROCHLORIDE 50 MG/ML
INJECTION INTRAMUSCULAR; INTRAVENOUS
Status: DISPENSED
Start: 2025-03-12 | End: 2025-03-12

## 2025-03-12 RX ORDER — DIPHENHYDRAMINE HYDROCHLORIDE 50 MG/ML
INJECTION INTRAMUSCULAR; INTRAVENOUS
Status: COMPLETED | OUTPATIENT
Start: 2025-03-12 | End: 2025-03-12

## 2025-03-12 RX ADMIN — MIDAZOLAM HYDROCHLORIDE 4 MG: 1 INJECTION, SOLUTION INTRAMUSCULAR; INTRAVENOUS at 08:22

## 2025-03-12 RX ADMIN — MIDAZOLAM HYDROCHLORIDE 2 MG: 1 INJECTION, SOLUTION INTRAMUSCULAR; INTRAVENOUS at 08:28

## 2025-03-12 RX ADMIN — DIPHENHYDRAMINE HYDROCHLORIDE 50 MG: 50 INJECTION, SOLUTION INTRAMUSCULAR; INTRAVENOUS at 08:30

## 2025-03-12 RX ADMIN — MORPHINE SULFATE 2 MG: 2 INJECTION, SOLUTION INTRAMUSCULAR; INTRAVENOUS at 08:25

## 2025-03-12 NOTE — DISCHARGE INSTRUCTIONS
[facility] Phone Number: [phone]    A responsible adult should drive you home and stay with you today and tonight.  Do not drive a car or operate any hazardous machinery for 24 hours.  Do not drink any alcoholic beverages for 24 hours.  Do not make any legal decisions for 24 hours after sedation.  Do not engage in any strenuous activity.  Resume your medications, following prescribed instructions.  Contact your caregiver if you have questions or concerns about your care.    In the event of an emergency, call 911 or go to your nearest emergency room.    You received the following medications during your procedure: Versed, Morphine, and Benadryl

## 2025-03-18 ENCOUNTER — CLINICAL SUPPORT (OUTPATIENT)
Dept: CARDIOLOGY | Facility: CLINIC | Age: 65
End: 2025-03-18
Payer: MEDICARE

## 2025-03-18 VITALS
SYSTOLIC BLOOD PRESSURE: 126 MMHG | BODY MASS INDEX: 38.22 KG/M2 | HEIGHT: 70 IN | WEIGHT: 267 LBS | DIASTOLIC BLOOD PRESSURE: 87 MMHG | HEART RATE: 65 BPM

## 2025-03-18 DIAGNOSIS — I48.0 PAROXYSMAL ATRIAL FIBRILLATION: Primary | ICD-10-CM

## 2025-03-18 NOTE — PROGRESS NOTES
ECG 12 Lead Pre-Op / Pre-Procedure    Date/Time: 3/18/2025 1:26 PM  Performed by: Bernadette Gloria APRN    Authorized by: Bernadette Gloria APRN  Comparison: compared with previous ECG   Comparison to previous ECG: Significant rhythm change from previous.  Now sinus rhythm, previously atrial fibrillation  Rhythm: sinus rhythm  Rate: normal  BPM: 61  QRS axis: normal    Clinical impression: normal ECG

## 2025-03-19 LAB
QT INTERVAL: 383 MS
QTC INTERVAL: 486 MS

## 2025-04-02 ENCOUNTER — OFFICE VISIT (OUTPATIENT)
Dept: SLEEP MEDICINE | Facility: HOSPITAL | Age: 65
End: 2025-04-02
Payer: MEDICARE

## 2025-04-02 VITALS
BODY MASS INDEX: 37.77 KG/M2 | SYSTOLIC BLOOD PRESSURE: 126 MMHG | OXYGEN SATURATION: 97 % | DIASTOLIC BLOOD PRESSURE: 83 MMHG | HEART RATE: 66 BPM | WEIGHT: 263.8 LBS | HEIGHT: 70 IN

## 2025-04-02 DIAGNOSIS — E66.812 CLASS 2 OBESITY: ICD-10-CM

## 2025-04-02 DIAGNOSIS — G47.33 OSA ON CPAP: Primary | ICD-10-CM

## 2025-04-02 PROCEDURE — 1160F RVW MEDS BY RX/DR IN RCRD: CPT | Performed by: INTERNAL MEDICINE

## 2025-04-02 PROCEDURE — 99213 OFFICE O/P EST LOW 20 MIN: CPT | Performed by: INTERNAL MEDICINE

## 2025-04-02 PROCEDURE — G0463 HOSPITAL OUTPT CLINIC VISIT: HCPCS

## 2025-04-02 PROCEDURE — 3079F DIAST BP 80-89 MM HG: CPT | Performed by: INTERNAL MEDICINE

## 2025-04-02 PROCEDURE — 3074F SYST BP LT 130 MM HG: CPT | Performed by: INTERNAL MEDICINE

## 2025-04-02 PROCEDURE — 1159F MED LIST DOCD IN RCRD: CPT | Performed by: INTERNAL MEDICINE

## 2025-04-02 RX ORDER — SEMAGLUTIDE 1 MG/.5ML
INJECTION, SOLUTION SUBCUTANEOUS
COMMUNITY
Start: 2025-03-03

## 2025-04-02 RX ORDER — METHOCARBAMOL 750 MG/1
TABLET, FILM COATED ORAL
COMMUNITY

## 2025-04-02 RX ORDER — SEMAGLUTIDE 0.5 MG/.5ML
INJECTION, SOLUTION SUBCUTANEOUS
COMMUNITY

## 2025-04-02 NOTE — PROGRESS NOTES
"  [facility]  Sleep medicine  [address] [place] [address]  [place]   [place] [address]  Phone: [phone]  Fax: [phone]      SLEEP CLINIC FOLLOW UP PROGRESS NOTE.    Jerman Caro  [id number]   1960  64 y.o.  male      PCP: Giovanny Elliott MD      Date of visit: 4/2/2025    Chief Complaint   Patient presents with    Sleep Apnea    Obesity       HPI:  This is a 64 y.o. years old patient is here for the management of obstructive sleep apnea.  . Patient is using positive airway pressure therapy with auto CPAP.. Normally goes to bed at 11 PM and wakes up at 5:30 AM.  Unfortunately has not been able to use the CPAP he says that he needs a new mask and mask fit.  I had a long talk with the patient about the health benefits of using the CPAP and he wants to try a different mask        Medications and allergies are reviewed by me and documented in the encounter.     SOCIAL (habits pertaining to sleep medicine)  History tobacco use:No   History of alcohol use: 0 per week  Caffeine use: 2     REVIEW OF SYSTEMS:   [place] Sleepiness Scale :Total score: 12   Nasal congestion:No   Dry mouth/nose:No   Post nasal drip; No   Acid reflux/Heartburn:No   Abd bloating:No   Morning headache:No   Anxiety:No   Depression:No    PHYSICAL EXAMINATION:  CONSTITUTIONAL:  Vitals:    04/02/25 1300   BP: 126/83   Pulse: 66   SpO2: 97%   Weight: 120 kg (263 lb 12.8 oz)   Height: 177.8 cm (70\")    Body mass index is 37.85 kg/m².   NOSE: nasal passages are clear, No deformities noted   RESP SYSTEM: Not in any respiratory distress, no chest deformities noted,   CARDIOVASULAR: No edema noted  NEURO: Oriented x 3, gait normal,  Mood and affect appeared appropriate        ASSESSMENT AND PLAN:  Obstructive sleep apnea ( G 47.33).  [facility] am [facility] mask [facility].  Patient will start using the CPAP and come back and see me in about 4 months for another compliance check to make sure his sleep apnea is well " treated.  Without proper control of sleep apnea and good compliance there is a increased risk for hypertension, diabetes mellitus and nonrestorative sleep with hypersomnia which can increase risk for motor vehicle accidents.  Untreated sleep apnea is also a risk factor for development of atrial fibrillation, pulmonary hypertension and stroke.   Obesity  2 with BMI is Body mass index is 37.85 kg/m².. I have discuss the relationship between the weight and sleep apnea. The benefit of weight loss in reducing severity of sleep apnea was discussed. Discussed diet and exercise with the patient to achieve ideal BMI.  Return in about 4 months (around 8/2/2025) for Recheck with smart card down load. . Patient's questions were answered.      Brandon Gulilermo MD  Sleep Medicine.  Medical Director, [facility] sleep [facility]  4/2/2025 ,

## 2025-05-07 ENCOUNTER — OFFICE VISIT (OUTPATIENT)
Dept: SLEEP MEDICINE | Facility: HOSPITAL | Age: 65
End: 2025-05-07
Payer: MEDICARE

## 2025-05-07 VITALS
HEIGHT: 70 IN | BODY MASS INDEX: 37.92 KG/M2 | OXYGEN SATURATION: 96 % | WEIGHT: 264.9 LBS | SYSTOLIC BLOOD PRESSURE: 105 MMHG | DIASTOLIC BLOOD PRESSURE: 66 MMHG | HEART RATE: 75 BPM

## 2025-05-07 DIAGNOSIS — I10 BENIGN ESSENTIAL HYPERTENSION: ICD-10-CM

## 2025-05-07 DIAGNOSIS — E66.812 CLASS 2 OBESITY: ICD-10-CM

## 2025-05-07 DIAGNOSIS — I48.0 PAROXYSMAL ATRIAL FIBRILLATION: Chronic | ICD-10-CM

## 2025-05-07 DIAGNOSIS — G47.33 OSA ON CPAP: Primary | ICD-10-CM

## 2025-05-07 PROCEDURE — 3078F DIAST BP <80 MM HG: CPT | Performed by: INTERNAL MEDICINE

## 2025-05-07 PROCEDURE — 1159F MED LIST DOCD IN RCRD: CPT | Performed by: INTERNAL MEDICINE

## 2025-05-07 PROCEDURE — G0463 HOSPITAL OUTPT CLINIC VISIT: HCPCS

## 2025-05-07 PROCEDURE — 99213 OFFICE O/P EST LOW 20 MIN: CPT | Performed by: INTERNAL MEDICINE

## 2025-05-07 PROCEDURE — 1160F RVW MEDS BY RX/DR IN RCRD: CPT | Performed by: INTERNAL MEDICINE

## 2025-05-07 PROCEDURE — 3074F SYST BP LT 130 MM HG: CPT | Performed by: INTERNAL MEDICINE

## 2025-05-07 NOTE — PROGRESS NOTES
"  [facility]  Sleep Medicine   [address] [place] [address]  [place]   [place] [address]  Phone: [phone]  Fax: [phone]      SLEEP CLINIC FOLLOW UP PROGRESS NOTE.    Jerman Caro  [id number]   1960  64 y.o.  male      PCP: Giovanny Elliott MD      Date of visit: 5/7/2025    Chief Complaint   Patient presents with    Sleep Apnea    Obesity       HPI:  This is a 64 y.o. years old patient is here for the management of obstructive sleep apnea. Patient is using positive airway pressure therapy with auto CPAP and the symptoms of sleep apnea have improved significantly on the therapy. Normally patient goes to bed at 11 PM and wakes up at 5 AM .  The patient wakes up 2 time(s) during the night and has no problem going back to sleep.  Feels refreshed after waking up.  He is using a fullface mask under the nose type and feels lot better    Medications and allergies are reviewed by me and documented in the encounter.     SOCIAL (habits pertaining to sleep medicine)  History tobacco use:No   History of alcohol use: 0 per week  Caffeine use: 3     REVIEW OF SYSTEMS:   Pertaining positive symptoms are:  [place] Sleepiness Scale :Total score: 17       PHYSICAL EXAMINATION:  CONSTITUTIONAL:  Vitals:    05/07/25 1300   BP: 105/66   Pulse: 75   SpO2: 96%   Weight: 120 kg (264 lb 14.4 oz)   Height: 177.8 cm (70\")    Body mass index is 38.01 kg/m².   NOSE: nasal passages are clear, No deformities noted   RESP SYSTEM: Not in any respiratory distress, no chest deformities noted,   CARDIOVASULAR: No edema noted  NEURO: Oriented x 3, gait normal,  Mood and affect appeared appropriate      Data reviewed:  The Smart card downloaded on 5/7/2025 has been reviewed independently by me for compliance and discussed the data with the patient.   Compliance; 80%  More than 4 hr use, 75%  Average use of the device 6 hours and 36 minutes per night  Residual AHI: 2.7 /hr (Optimal < 5/hr, Good <10/hr, Adequate reduce by 75% from " baseline)  Mask type: Fullface mask  Device: ResMed  DME: [employer]        ASSESSMENT AND PLAN:  Obstructive sleep apnea ( G 47.33).  The symptoms of sleep apnea have improved with the device and the treatment.  Patient's compliance with the device is excellent for treatment of sleep apnea.  I have independently reviewed the smart card down load and discussed with the patient the download data and encouarged the patient to continue to use the device.The residual AHI is acceptable. The device is benefiting the patient and the device is medically necessary.  Without proper control of sleep apnea and good compliance there is a increased risk for hypertension, diabetes mellitus and nonrestorative sleep with hypersomnia which can increase risk for motor vehicle accidents.  Untreated sleep apnea is also a risk factor for development of atrial fibrillation, pulmonary hypertension, insulin resistance and stroke. The patient is also instructed to get the supplies from the DME company and and change them on a regular basis.  A prescription for supplies has been sent to the DME company.  I have also discussed the good sleep hygiene habits and adequate amount of sleep needed for good health.  Obesity  2 with BMI is Body mass index is 38.01 kg/m².. I have discuss the relationship between the weight and sleep apnea. The benefit of weight loss in reducing severity of sleep apnea was discussed. Discussed diet and exercise with the patient to achieve ideal BMI  Return in about 1 year (around 5/7/2026) for with smart card down load. . Patient's questions were answered.    5/7/2025  Brandon Guillermo MD  Sleep Medicine.  Medical Director,   [facility], [facility] sleep centers.

## 2025-06-10 ENCOUNTER — OFFICE VISIT (OUTPATIENT)
Dept: CARDIOLOGY | Facility: CLINIC | Age: 65
End: 2025-06-10
Payer: MEDICARE

## 2025-06-10 VITALS
SYSTOLIC BLOOD PRESSURE: 138 MMHG | HEART RATE: 73 BPM | BODY MASS INDEX: 37.8 KG/M2 | DIASTOLIC BLOOD PRESSURE: 83 MMHG | WEIGHT: 264 LBS | HEIGHT: 70 IN

## 2025-06-10 DIAGNOSIS — I10 BENIGN ESSENTIAL HYPERTENSION: ICD-10-CM

## 2025-06-10 DIAGNOSIS — E78.5 HYPERLIPIDEMIA LDL GOAL <100: ICD-10-CM

## 2025-06-10 DIAGNOSIS — I48.0 PAROXYSMAL ATRIAL FIBRILLATION: Primary | ICD-10-CM

## 2025-06-10 PROCEDURE — 3079F DIAST BP 80-89 MM HG: CPT

## 2025-06-10 PROCEDURE — 1160F RVW MEDS BY RX/DR IN RCRD: CPT

## 2025-06-10 PROCEDURE — 3075F SYST BP GE 130 - 139MM HG: CPT

## 2025-06-10 PROCEDURE — G2211 COMPLEX E/M VISIT ADD ON: HCPCS

## 2025-06-10 PROCEDURE — 1159F MED LIST DOCD IN RCRD: CPT

## 2025-06-10 PROCEDURE — 99214 OFFICE O/P EST MOD 30 MIN: CPT

## 2025-06-10 NOTE — PROGRESS NOTES
Chief Complaint  Atrial Fibrillation (1 Year Follow up)    Subjective        History of Present Illness  Jerman Caro presents to [facility] for follow up.   Patient is a 64-year-old male with past medical history outlined below, significant for paroxysmal atrial fibrillation and hypertension who presents for routine follow-up s/p cardioversion in March. He is doing very well from a cardiac standpoint. He has no further palpitations. He has no chest pain or discomfort, dyspnea, edema or syncope.    Past Medical History:   Diagnosis Date    Atrial fibrillation     Colon cancer screening 03/17/2021    POSITIVE- REFERRED FOR COLONOSCOPY    Sleep apnea     USES CPAP       ALLERGY  No Known Allergies     Past Surgical History:   Procedure Laterality Date    CARDIOVERSION      COLONOSCOPY N/A 06/15/2021    Procedure: COLONOSCOPY w/ bx;  Surgeon: Boris Isaac MD;  Location: [facility] ENDOSCOPY;  Service: General;  Laterality: N/A;  colon polyp        Social History     Socioeconomic History    Marital status:    Tobacco Use    Smoking status: Never    Smokeless tobacco: Never   Vaping Use    Vaping status: Never Used   Substance and Sexual Activity    Alcohol use: Yes     Comment: VERY RARELY    Drug use: Never    Sexual activity: Defer       Family History   Problem Relation Age of Onset    Hypertension Mother     Heart disease Father     Hypertension Father     Hypertension Brother         Current Outpatient Medications on File Prior to Visit   Medication Sig    amLODIPine (NORVASC) 5 MG tablet TAKE 1 TABLET BY MOUTH EVERY DAY    apixaban (Eliquis) 5 MG tablet tablet Take 1 tablet by mouth 2 (Two) Times a Day.    atorvastatin (LIPITOR) 10 MG tablet Take 1 tablet by mouth every night at bedtime.    cetirizine (zyrTEC) 10 MG tablet Take 1 tablet by mouth Daily.    losartan (COZAAR) 25 MG tablet Take 1 tablet by mouth Every Morning.    methocarbamol (ROBAXIN) 750 MG tablet TAKE 1  "TABLET BY MOUTH EVERY 12 HOURS AT BEDTIME    metoprolol succinate XL (TOPROL-XL) 50 MG 24 hr tablet TAKE 1 TABLET BY MOUTH EVERY MORNING, AND 1 TABLET BY MOUTH EVERY EVENING    multivitamin with minerals tablet tablet Take 1 tablet by mouth Daily.    terazosin (HYTRIN) 5 MG capsule TAKE 1 CAPSULE BY MOUTH 2 TIMES DAILY    triamterene-hydrochlorothiazide (MAXZIDE-25) 37.5-25 MG per tablet Take 1 tablet by mouth Daily.    Wegovy 1 MG/0.5ML solution auto-injector INJECT 1MG SUBCUTANEOUS EVERY WEEK    Wegovy 0.5 MG/0.5ML solution auto-injector INJECT 0.5 MG UNDER THE SKIN AS DIRECTED (Patient not taking: Reported on 4/2/2025)     No current facility-administered medications on file prior to visit.       Objective   Vitals:    06/10/25 0922   BP: 138/83   Pulse: 73   Weight: 120 kg (264 lb)   Height: 177.8 cm (70\")       Physical Exam  Constitutional:       General: He is awake. He is not in acute distress.     Appearance: Normal appearance.   HENT:      Head: Normocephalic.      Nose: Nose normal. No congestion.   Eyes:      Extraocular Movements: Extraocular movements intact.      Conjunctiva/sclera: Conjunctivae normal.      Pupils: Pupils are equal, round, and reactive to light.   Neck:      Thyroid: No thyromegaly.      Vascular: No JVD.   Cardiovascular:      Rate and Rhythm: Normal rate and regular rhythm.      Chest Wall: PMI is not displaced.      Pulses: Normal pulses.      Heart sounds: Normal heart sounds, S1 normal and S2 normal. No murmur heard.     No friction rub. No gallop. No S3 or S4 sounds.   Pulmonary:      Effort: Pulmonary effort is normal.      Breath sounds: Normal breath sounds. No wheezing, rhonchi or rales.   Abdominal:      General: Bowel sounds are normal.      Palpations: Abdomen is soft.      Tenderness: There is no abdominal tenderness.   Musculoskeletal:      Cervical back: No tenderness.      Right lower leg: No edema.      Left lower leg: No edema.   Lymphadenopathy:      Cervical: No " cervical adenopathy.   Skin:     General: Skin is warm and dry.      Capillary Refill: Capillary refill takes less than 2 seconds.      Coloration: Skin is not cyanotic.      Findings: No petechiae or rash.      Nails: There is no clubbing.   Neurological:      Mental Status: He is alert.   Psychiatric:         Mood and Affect: Mood normal.         Behavior: Behavior is cooperative.           Result Review     The following data was reviewed by OSCAR Fuentes on 06/10/25.      CMP          9/25/2024    06:46 3/3/2025    16:02   CMP   Glucose 107  82    BUN 15  15    Creatinine 0.93  1.09    EGFR 90.6  74.9    Sodium 140  139    Potassium 3.9  3.8    Chloride 106  104    Calcium 9.0  8.9    Total Protein 6.5  6.7    Albumin 4.1  4.1    Globulin 2.4  2.6    Total Bilirubin 0.4  0.3    Alkaline Phosphatase 82  78    AST (SGOT) 28  23    ALT (SGPT) 32  29    Albumin/Globulin Ratio 1.7  1.6    BUN/Creatinine Ratio 16.1  13.8    Anion Gap 8.7  11.0      CBC w/diff          9/25/2024    06:46   CBC w/Diff   WBC 5.48    RBC 4.96    Hemoglobin 13.7    Hematocrit 42.8    MCV 86.3    MCH 27.6    MCHC 32.0    RDW 12.7    Platelets 159    Neutrophil Rel % 72.0    Immature Granulocyte Rel % 0.4    Lymphocyte Rel % 19.2    Monocyte Rel % 7.7    Eosinophil Rel % 0.2    Basophil Rel % 0.5       Lipid Panel          9/25/2024    06:46   Lipid Panel   Total Cholesterol 118    Triglycerides 93    HDL Cholesterol 42    VLDL Cholesterol 18    LDL Cholesterol  58    LDL/HDL Ratio 1.37            === Results for orders placed during the hospital encounter of 03/12/25 ===    CARDIOVERSION EXTERNAL IN CARDIOLOGY DEPARTMENT    - Interpretation Summary -    Post cardioversion the patient displayed a sinus rhythm.    The cardioversion was successful with 200 biphasic synchronized joules.  If patient has recurrent atrial fibrillation will have to discuss antiarrhythmic drugs.  He has maintained sinus rhythm after last cardioversion for  several years.          Procedures      Assessment & Plan  Paroxysmal atrial fibrillation  Maintaining a normal rhythm by exam today. Tolerating anticoagulation with no bleeding or issues. Continue current regimen.  Benign essential hypertension  Blood pressure is well controlled.  Hyperlipidemia LDL goal <100   Continue statin therapy.     Follow Up   Return in about 6 months (around 12/10/2025) for With Dr. Long.    Patient was given instructions and counseling regarding his condition or for health maintenance advice. Please see specific information pulled into the AVS if appropriate.     OSCAR Fuentes  06/10/25  13:06 EDT    Dictated Utilizing Dragon Dictation

## 2025-06-10 NOTE — ASSESSMENT & PLAN NOTE
Maintaining a normal rhythm by exam today. Tolerating anticoagulation with no bleeding or issues. Continue current regimen.

## 2025-06-19 ENCOUNTER — TRANSCRIBE ORDERS (OUTPATIENT)
Dept: LAB | Facility: HOSPITAL | Age: 65
End: 2025-06-19
Payer: COMMERCIAL

## 2025-06-19 DIAGNOSIS — R53.83 OTHER FATIGUE: ICD-10-CM

## 2025-06-19 DIAGNOSIS — Z12.5 PROSTATE CANCER SCREENING: ICD-10-CM

## 2025-06-19 DIAGNOSIS — E55.9 VITAMIN D DEFICIENCY: Primary | ICD-10-CM

## 2025-06-19 DIAGNOSIS — R73.9 HYPERGLYCEMIA: ICD-10-CM

## 2025-06-19 DIAGNOSIS — I10 HYPERTENSION, ESSENTIAL: ICD-10-CM

## 2025-07-30 RX ORDER — METOPROLOL SUCCINATE 50 MG/1
TABLET, EXTENDED RELEASE ORAL
Qty: 180 TABLET | Refills: 3 | Status: SHIPPED | OUTPATIENT
Start: 2025-07-30

## 2025-08-18 RX ORDER — AMLODIPINE BESYLATE 5 MG/1
5 TABLET ORAL DAILY
Qty: 90 TABLET | Refills: 3 | Status: SHIPPED | OUTPATIENT
Start: 2025-08-18 | End: 2025-08-19

## 2025-08-19 RX ORDER — AMLODIPINE BESYLATE 5 MG/1
5 TABLET ORAL DAILY
Qty: 90 TABLET | Refills: 3 | Status: SHIPPED | OUTPATIENT
Start: 2025-08-19

## 2025-08-26 RX ORDER — APIXABAN 5 MG/1
5 TABLET, FILM COATED ORAL 2 TIMES DAILY
Qty: 180 TABLET | Refills: 3 | Status: SHIPPED | OUTPATIENT
Start: 2025-08-26 | End: 2025-08-26

## (undated) DEVICE — Device: Brand: DEFENDO AIR/WATER/SUCTION AND BIOPSY VALVE

## (undated) DEVICE — COLON KIT: Brand: MEDLINE INDUSTRIES, INC.

## (undated) DEVICE — SOL IRRG H2O PL/BG 1000ML STRL

## (undated) DEVICE — SINGLE-USE BIOPSY FORCEPS: Brand: RADIAL JAW 4